# Patient Record
Sex: FEMALE | Race: WHITE | URBAN - METROPOLITAN AREA
[De-identification: names, ages, dates, MRNs, and addresses within clinical notes are randomized per-mention and may not be internally consistent; named-entity substitution may affect disease eponyms.]

---

## 2017-10-16 ENCOUNTER — TRANSFERRED RECORDS (OUTPATIENT)
Dept: HEALTH INFORMATION MANAGEMENT | Facility: CLINIC | Age: 32
End: 2017-10-16

## 2018-01-02 ENCOUNTER — TRANSFERRED RECORDS (OUTPATIENT)
Dept: HEALTH INFORMATION MANAGEMENT | Facility: HOSPITAL | Age: 33
End: 2018-01-02

## 2018-01-09 DIAGNOSIS — H81.03 MENIERE'S DISEASE, BILATERAL: Primary | ICD-10-CM

## 2018-01-10 RX ORDER — TRIAMCINOLONE ACETONIDE 0.25 MG/G
1 CREAM TOPICAL 2 TIMES DAILY
COMMUNITY
End: 2018-05-07

## 2018-01-21 ENCOUNTER — HOSPITAL ENCOUNTER (EMERGENCY)
Facility: HOSPITAL | Age: 33
Discharge: HOME | End: 2018-01-21
Attending: NURSE PRACTITIONER | Admitting: NURSE PRACTITIONER
Payer: COMMERCIAL

## 2018-01-21 VITALS
OXYGEN SATURATION: 98 % | RESPIRATION RATE: 20 BRPM | TEMPERATURE: 98.3 F | SYSTOLIC BLOOD PRESSURE: 129 MMHG | DIASTOLIC BLOOD PRESSURE: 83 MMHG | HEART RATE: 81 BPM

## 2018-01-21 DIAGNOSIS — Z87.891 PERSONAL HISTORY OF TOBACCO USE, PRESENTING HAZARDS TO HEALTH: ICD-10-CM

## 2018-01-21 DIAGNOSIS — J06.9 UPPER RESPIRATORY TRACT INFECTION, UNSPECIFIED TYPE: ICD-10-CM

## 2018-01-21 PROCEDURE — G0463 HOSPITAL OUTPT CLINIC VISIT: HCPCS

## 2018-01-21 PROCEDURE — 99202 OFFICE O/P NEW SF 15 MIN: CPT | Performed by: NURSE PRACTITIONER

## 2018-01-21 RX ORDER — CODEINE PHOSPHATE AND GUAIFENESIN 10; 100 MG/5ML; MG/5ML
1 SOLUTION ORAL EVERY 6 HOURS PRN
Qty: 120 ML | Refills: 0 | Status: SHIPPED | OUTPATIENT
Start: 2018-01-21 | End: 2018-03-28

## 2018-01-21 RX ORDER — FLUTICASONE PROPIONATE 50 MCG
1-2 SPRAY, SUSPENSION (ML) NASAL DAILY
Qty: 1 BOTTLE | Refills: 0 | Status: SHIPPED | OUTPATIENT
Start: 2018-01-21 | End: 2018-05-06

## 2018-01-21 ASSESSMENT — ENCOUNTER SYMPTOMS
ABDOMINAL PAIN: 0
FEVER: 0
RHINORRHEA: 1
SORE THROAT: 0
COUGH: 1
DIZZINESS: 1

## 2018-01-21 NOTE — ED PROVIDER NOTES
History     Chief Complaint   Patient presents with     URI     Cough     dry cough     The history is provided by the patient. No  was used.     Obdulia Rey is a 32 year old female who presents with a sinus infection for 3 weeks, states she's been on 2 - Zpaks with no improvement. Has developed a cough this past week. No fever, no change in appetite. Her Meniere's has been worse with the sinus congestion. Smokes 1 ppd.   Scheduled to see ENT tomorrow for Menieres.     Problem List:    There are no active problems to display for this patient.       Past Medical History:    Past Medical History:   Diagnosis Date     Bilateral sensorineural hearing loss      Dysfunctional uterine bleeding      GERD (gastroesophageal reflux disease)      Hearing problem      Meniere's disease      Menorrhagia with irregular cycle      Myalgia      Restless legs syndrome      Tinnitus      Tobacco use disorder      Unsteady gait        Past Surgical History:    History reviewed. No pertinent surgical history.    Family History:    Family History   Problem Relation Age of Onset     Substance Abuse Father      MENTAL ILLNESS Father      CANCER Father      Depression Father      CANCER Paternal Grandmother      DIABETES Maternal Grandmother      Hypertension Maternal Grandmother      CEREBROVASCULAR DISEASE Maternal Grandmother      HEART DISEASE Maternal Grandfather        Social History:  Marital Status:  Single [1]  Social History   Substance Use Topics     Smoking status: Current Every Day Smoker     Packs/day: 1.00     Years: 10.00     Types: Cigarettes     Start date: 2/5/2001     Smokeless tobacco: Never Used     Alcohol use 0.0 oz/week      Comment: a couple times per month        Medications:      fluticasone (FLONASE) 50 MCG/ACT spray   guaiFENesin-codeine (ROBITUSSIN AC) 100-10 MG/5ML SOLN solution   IBUPROFEN PO   HydrOXYzine HCl (ATARAX OR)   triamcinolone (KENALOG) 0.025 % cream   PARoxetine HCl  (PAXIL PO)   FEXOFENADINE HCL PO   Lansoprazole (PREVACID PO)   triamterene-hydrochlorothiazide (DYAZIDE) 37.5-25 MG per capsule   diazepam (VALIUM) 5 MG tablet   meclizine (MEDI-MECLIZINE) 25 MG tablet         Review of Systems   Constitutional: Negative for fever.   HENT: Positive for rhinorrhea. Negative for sore throat.    Respiratory: Positive for cough.    Gastrointestinal: Negative for abdominal pain.   Neurological: Positive for dizziness.       Physical Exam   BP: 129/83  Pulse: 81  Temp: 98.3  F (36.8  C)  Resp: 20  SpO2: 98 %      Physical Exam   Constitutional: She is oriented to person, place, and time. She appears well-developed and well-nourished. No distress.   HENT:   Head: Normocephalic and atraumatic.   Right Ear: External ear normal.   Left Ear: External ear normal.   Nose: Nose normal.   Mouth/Throat: Oropharynx is clear and moist. No oropharyngeal exudate.   Sinuses are clear here.    Eyes: Conjunctivae are normal. Right eye exhibits no discharge. Left eye exhibits no discharge. Scleral icterus is present.   Neck: Normal range of motion. Neck supple.   Cardiovascular: Normal rate, regular rhythm and normal heart sounds.    Pulmonary/Chest: Effort normal and breath sounds normal. No respiratory distress. She has no wheezes.   Occasional cough in office.    Musculoskeletal: Normal range of motion.   Neurological: She is alert and oriented to person, place, and time.   Skin: Skin is warm and dry. She is not diaphoretic.   Psychiatric: She has a normal mood and affect.   Nursing note and vitals reviewed.      ED Course     ED Course     Procedures    Labs Ordered and Resulted from Time of ED Arrival Up to the Time of Departure from the ED - No data to display    Assessments & Plan (with Medical Decision Making)     I have reviewed the nursing notes.  I have reviewed the findings, diagnosis, plan and need for follow up with the patient.  Afebrile, stable vitals. LS clear.   Will avoid giving another  antibiotic on top of just finishing 2 and this doesn't appear bacterial.   Advised to rest, push fluids. Call for appointment to see PCP for re-evaluation in 1-2 days.  Will give her Flonase and cough syrup with codeine for symptoms.   Advised to quit smoking.     Discharge Medication List as of 1/21/2018 11:44 AM      START taking these medications    Details   fluticasone (FLONASE) 50 MCG/ACT spray Spray 1-2 sprays into both nostrils daily, Disp-1 Bottle, R-0, E-Prescribe      guaiFENesin-codeine (ROBITUSSIN AC) 100-10 MG/5ML SOLN solution Take 5 mLs by mouth every 6 hours as needed for cough, Disp-120 mL, R-0, Local Print             Final diagnoses:   Upper respiratory tract infection, unspecified type   Personal history of tobacco use, presenting hazards to health       1/21/2018   [facility]     Anastasia Camp NP  01/21/18 [id number]

## 2018-01-21 NOTE — ED NOTES
C/o dry cough and URI for last 2-3 weeks pt states that she was seen by PCP and given two rounds of z pack with no relief

## 2018-01-21 NOTE — DISCHARGE INSTRUCTIONS
Call for a follow up to see PCP tomorrow for re-evaluation.   Rest and push fluids.   Take medications as ordered.   You may also want to try a netti-pot or saline spray in your sinuses.   Quit smoking.       Viral Upper Respiratory Illness (Adult)  You have a viral upper respiratory illness (URI), which is another term for the common cold. This illness is contagious during the first few days. It is spread through the air by coughing and sneezing. It may also be spread by direct contact (touching the sick person and then touching your own eyes, nose, or mouth). Frequent handwashing will decrease risk of spread. Most viral illnesses go away within 7 to 10 days with rest and simple home remedies. Sometimes the illness may last for several weeks. Antibiotics will not kill a virus, and they are generally not prescribed for this condition.    Home care    If symptoms are severe, rest at home for the first 2 to 3 days. When you resume activity, don't let yourself get too tired.    Avoid being exposed to cigarette smoke (yours or others ).    You may use acetaminophen or ibuprofen to control pain and fever, unless another medicine was prescribed. (Note: If you have chronic liver or kidney disease, have ever had a stomach ulcer or gastrointestinal bleeding, or are taking blood-thinning medicines, talk with your healthcare provider before using these medicines.) Aspirin should never be given to anyone under 18 years of age who is ill with a viral infection or fever. It may cause severe liver or brain damage.    Your appetite may be poor, so a light diet is fine. Avoid dehydration by drinking 6 to 8 glasses of fluids per day (water, soft drinks, juices, tea, or soup). Extra fluids will help loosen secretions in the nose and lungs.    Over-the-counter cold medicines will not shorten the length of time you re sick, but they may be helpful for the following symptoms: cough, sore throat, and nasal and sinus congestion. (Note: Do  not use decongestants if you have high blood pressure.)  Follow-up care  Follow up with your healthcare provider, or as advised.  When to seek medical advice  Call your healthcare provider right away if any of these occur:    Cough with lots of colored sputum (mucus)    Severe headache; face, neck, or ear pain    Difficulty swallowing due to throat pain    Fever of 100.4 F (38 C)  Call 911, or get immediate medical care  Call emergency services right away if any of these occur:    Chest pain, shortness of breath, wheezing, or difficulty breathing    Coughing up blood    Inability to swallow due to throat pain  Date Last Reviewed: 9/13/2015 2000-2017 [employer]. [address] [place] [address], [place] [address]. All rights reserved. This information is not intended as a substitute for professional medical care. Always follow your healthcare professional's instructions.

## 2018-01-21 NOTE — ED AVS SNAPSHOT
[facility]    [address] [place] [address]    Phone:  [phone]                                       Obdulia Rey   MRN: [id number]    Department:  [facility]   Date of Visit:  1/21/2018           Patient Information     Date Of Birth          1985        Your diagnoses for this visit were:     Upper respiratory tract infection, unspecified type     Personal history of tobacco use, presenting hazards to health        You were seen by Anastasia Camp NP.      Follow-up Information     Follow up with Kari Longo NP. Schedule an appointment as soon as possible for a visit in 1 day.    Specialty:  Nurse Practitioner    Why:  for re-evaluation    Contact information:    [facility]  [address] [facility] [address]  [phone]          Discharge Instructions       Call for a follow up to see PCP tomorrow for re-evaluation.   Rest and push fluids.   Take medications as ordered.   You may also want to try a netti-pot or saline spray in your sinuses.   Quit smoking.       Viral Upper Respiratory Illness (Adult)  You have a viral upper respiratory illness (URI), which is another term for the common cold. This illness is contagious during the first few days. It is spread through the air by coughing and sneezing. It may also be spread by direct contact (touching the sick person and then touching your own eyes, nose, or mouth). Frequent handwashing will decrease risk of spread. Most viral illnesses go away within 7 to 10 days with rest and simple home remedies. Sometimes the illness may last for several weeks. Antibiotics will not kill a virus, and they are generally not prescribed for this condition.    Home care    If symptoms are severe, rest at home for the first 2 to 3 days. When you resume activity, don't let yourself get too tired.    Avoid being exposed to cigarette smoke (yours or others ).    You may use acetaminophen or  ibuprofen to control pain and fever, unless another medicine was prescribed. (Note: If you have chronic liver or kidney disease, have ever had a stomach ulcer or gastrointestinal bleeding, or are taking blood-thinning medicines, talk with your healthcare provider before using these medicines.) Aspirin should never be given to anyone under 18 years of age who is ill with a viral infection or fever. It may cause severe liver or brain damage.    Your appetite may be poor, so a light diet is fine. Avoid dehydration by drinking 6 to 8 glasses of fluids per day (water, soft drinks, juices, tea, or soup). Extra fluids will help loosen secretions in the nose and lungs.    Over-the-counter cold medicines will not shorten the length of time you re sick, but they may be helpful for the following symptoms: cough, sore throat, and nasal and sinus congestion. (Note: Do not use decongestants if you have high blood pressure.)  Follow-up care  Follow up with your healthcare provider, or as advised.  When to seek medical advice  Call your healthcare provider right away if any of these occur:    Cough with lots of colored sputum (mucus)    Severe headache; face, neck, or ear pain    Difficulty swallowing due to throat pain    Fever of 100.4 F (38 C)  Call 911, or get immediate medical care  Call emergency services right away if any of these occur:    Chest pain, shortness of breath, wheezing, or difficulty breathing    Coughing up blood    Inability to swallow due to throat pain  Date Last Reviewed: 9/13/2015 2000-2017 [employer]. [address] [place] [address]. All rights reserved. This information is not intended as a substitute for professional medical care. Always follow your healthcare professional's instructions.            Discharge References/Attachments     SMOKING CESSATION (ENGLISH)    SMOKING WITHDRAWAL, COPING WITH (ENGLISH)      Future Appointments        Provider Department Dept Phone Center     1/26/2018 1:30 PM Abdulkadir Soni [facility] [phone] [place]    1/26/2018 2:00 PM Aurora Loja PA-C [facility] [phone] [place]         Review of your medicines      START taking        Dose / Directions Last dose taken    fluticasone 50 MCG/ACT spray   Commonly known as:  FLONASE   Dose:  1-2 spray   Quantity:  1 Bottle        Spray 1-2 sprays into both nostrils daily   Refills:  0        guaiFENesin-codeine 100-10 MG/5ML Soln solution   Commonly known as:  ROBITUSSIN AC   Dose:  1 tsp.   Quantity:  120 mL        Take 5 mLs by mouth every 6 hours as needed for cough   Refills:  0          Our records show that you are taking the medicines listed below. If these are incorrect, please call your family doctor or clinic.        Dose / Directions Last dose taken    ATARAX OR   Dose:  25 mg        Take 25 mg by mouth every 8 hours as needed   Refills:  0        diazepam 5 MG tablet   Commonly known as:  VALIUM   Dose:  5 mg   Quantity:  20 tablet        Take 1 tablet (5 mg) by mouth every 6 hours as needed for anxiety, sleep or other (dizziness)   Refills:  0        FEXOFENADINE HCL PO   Dose:  180 mg        Take 180 mg by mouth daily   Refills:  0        IBUPROFEN PO   Dose:  800 mg        Take 800 mg by mouth every 8 hours   Refills:  0        meclizine 25 MG tablet   Commonly known as:  MEDI-MECLIZINE   Dose:  50 mg   Quantity:  90 tablet        Take 2 tablets (50 mg) by mouth 3 times daily as needed for dizziness   Refills:  0        PAXIL PO   Dose:  10 mg        Take 10 mg by mouth daily   Refills:  0        PREVACID PO        Refills:  0        triamcinolone 0.025 % cream   Commonly known as:  KENALOG   Dose:  1 applicator        Apply 1 applicator topically 2 times daily   Refills:  0        triamterene-hydrochlorothiazide 37.5-25 MG per capsule   Commonly known as:  DYAZIDE   Dose:  1 capsule   Quantity:  30 capsule        Take 1 capsule by mouth daily  "  Refills:  11                Prescriptions were sent or printed at these locations (2 Prescriptions)                   [id number] - [place] - [address] [address]    Telephone:  [phone]   Fax:  [phone]   Hours:                  E-Prescribed (1 of 2)         fluticasone (FLONASE) 50 MCG/ACT spray                 Printed at Department/Unit printer (1 of 2)         guaiFENesin-codeine (ROBITUSSIN AC) 100-10 MG/5ML SOLN solution                Orders Needing Specimen Collection     None      Pending Results     No orders found from 2018 to 2018.            Pending Culture Results     No orders found from 2018 to 2018.            Thank you for choosing [facility]       Thank you for choosing [facility] for your care. Our goal is always to provide you with excellent care. Hearing back from our patients is one way we can continue to improve our services. Please take a few minutes to complete the written survey that you may receive in the mail after you visit with us. Thank you!        [facility] Information     [facility] lets you send messages to your doctor, view your test results, renew your prescriptions, schedule appointments and more. To sign up, go to [email or web address] . Click on \"Log in\" on the left side of the screen, which will take you to the Welcome page. Then click on \"Sign up Now\" on the right side of the page.     You will be asked to enter the access code listed below, as well as some personal information. Please follow the directions to create your username and password.     Your access code is: [email or web address]  Expires: 2018 11:44 AM     Your access code will  in 90 days. If you need help or a new code, please call your [facility] clinic or [phone].        Care EveryWhere ID     This is your Care EveryWhere ID. This could be used by other organizations to access your [facility] medical records  [id number]        Equal Access " to Services     AMANDA PUTNAM : Kelly Ruggiero, abimael daniels, indigo simpson. [facility] [phone].    ATENCIÓN: Si habla español, tiene a lopez disposición servicios gratuitos de asistencia lingüística. Llame al [phone].    We comply with applicable federal civil rights laws and [place] laws. We do not discriminate on the basis of race, color, national origin, age, disability, sex, sexual orientation, or gender identity.            After Visit Summary       This is your record. Keep this with you and show to your community pharmacist(s) and doctor(s) at your next visit.

## 2018-01-21 NOTE — ED AVS SNAPSHOT
[facility]    [address] [address]    Phone:  [phone]                                       Obdulia Rey   MRN: [id number]    Department:  [facility]   Date of Visit:  1/21/2018           After Visit Summary Signature Page     I have received my discharge instructions, and my questions have been answered. I have discussed any challenges I see with this plan with the nurse or doctor.    ..........................................................................................................................................  Patient/Patient Representative Signature      ..........................................................................................................................................  Patient Representative Print Name and Relationship to Patient    ..................................................               ................................................  Date                                            Time    ..........................................................................................................................................  Reviewed by Signature/Title    ...................................................              ..............................................  Date                                                            Time

## 2018-01-22 ENCOUNTER — TRANSFERRED RECORDS (OUTPATIENT)
Dept: HEALTH INFORMATION MANAGEMENT | Facility: HOSPITAL | Age: 33
End: 2018-01-22

## 2018-01-22 LAB
ALT SERPL-CCNC: 21 U/L (ref 18–65)
AST SERPL-CCNC: 9 U/L (ref 10–30)
CHOLEST SERPL-MCNC: 194 MG/DL
CREAT SERPL-MCNC: 0.85 MG/DL (ref 0.7–1.2)
GLUCOSE SERPL-MCNC: 95 MG/DL (ref 60–99)
HDLC SERPL-MCNC: 27 MG/DL
LDLC SERPL CALC-MCNC: 113 MG/DL
POTASSIUM SERPL-SCNC: 4.2 MEQ/L (ref 3.5–5.1)
TRIGL SERPL-MCNC: 269 MG/DL
TSH SERPL-ACNC: 1.77 MIU/ML (ref 0.35–4.8)

## 2018-02-07 ENCOUNTER — OFFICE VISIT (OUTPATIENT)
Dept: SLEEP MEDICINE | Facility: HOSPITAL | Age: 33
End: 2018-02-07
Attending: INTERNAL MEDICINE
Payer: COMMERCIAL

## 2018-02-07 ENCOUNTER — OFFICE VISIT (OUTPATIENT)
Dept: AUDIOLOGY | Facility: OTHER | Age: 33
End: 2018-02-07
Attending: AUDIOLOGIST
Payer: COMMERCIAL

## 2018-02-07 VITALS
SYSTOLIC BLOOD PRESSURE: 110 MMHG | DIASTOLIC BLOOD PRESSURE: 78 MMHG | HEIGHT: 66 IN | RESPIRATION RATE: 14 BRPM | WEIGHT: 290 LBS | HEART RATE: 98 BPM | BODY MASS INDEX: 46.61 KG/M2 | OXYGEN SATURATION: 98 %

## 2018-02-07 DIAGNOSIS — H90.8 MIXED HEARING LOSS, UNILATERAL: ICD-10-CM

## 2018-02-07 DIAGNOSIS — H69.93 DYSFUNCTION OF BOTH EUSTACHIAN TUBES: ICD-10-CM

## 2018-02-07 DIAGNOSIS — G47.33 OBSTRUCTIVE SLEEP APNEA SYNDROME: Primary | ICD-10-CM

## 2018-02-07 PROCEDURE — 99241 ZZC OFFICE CONSULTATION,LEVEL I: CPT | Performed by: INTERNAL MEDICINE

## 2018-02-07 PROCEDURE — 92567 TYMPANOMETRY: CPT | Performed by: AUDIOLOGIST

## 2018-02-07 PROCEDURE — 92557 COMPREHENSIVE HEARING TEST: CPT | Performed by: AUDIOLOGIST

## 2018-02-07 PROCEDURE — G0463 HOSPITAL OUTPT CLINIC VISIT: HCPCS

## 2018-02-07 NOTE — PROGRESS NOTES
Audiology Evaluation Completed. Please refer SCANNED AUDIOGRAM and/or TYMPANOGRAM for BACKGROUND, RESULTS, RECOMMENDATIONS.  Estephania Nguyen M.S., [facility]  Audiologist #[id number]

## 2018-02-07 NOTE — PROGRESS NOTES
"31 y/o referred by Bar Longo with sleep apnea and insomnia. Pt has been a long term snorer and has been noted to have occasional apneas. Most of her family have LATASHA and are on cpap, she is quite obese at 5 6 290. Her sleep hours are 1 A to 11A. She has consistent trouble falling asleep and staying asleep. CD gave her Trazodone and she is regularly taking 50 mg hs. It does help her with the insomnia but gives her some hangover effect.     PMH  Menieres disease, chronic, on valium 10 bid for this, hx of GERD    PE   /78  Pulse 98  Resp 14  Ht 5' 6\" (1.676 m)  Wt 290 lb (131.5 kg)  SpO2 98%  BMI 46.81 kg/m2              heent very full ant neck, narrow pharynx,       Current Outpatient Prescriptions:      fluticasone (FLONASE) 50 MCG/ACT spray, Spray 1-2 sprays into both nostrils daily, Disp: 1 Bottle, Rfl: 0     guaiFENesin-codeine (ROBITUSSIN AC) 100-10 MG/5ML SOLN solution, Take 5 mLs by mouth every 6 hours as needed for cough, Disp: 120 mL, Rfl: 0     IBUPROFEN PO, Take 800 mg by mouth every 8 hours, Disp: , Rfl:      HydrOXYzine HCl (ATARAX OR), Take 25 mg by mouth every 8 hours as needed, Disp: , Rfl:      triamcinolone (KENALOG) 0.025 % cream, Apply 1 applicator topically 2 times daily, Disp: , Rfl:      PARoxetine HCl (PAXIL PO), Take 10 mg by mouth daily, Disp: , Rfl:      FEXOFENADINE HCL PO, Take 180 mg by mouth daily, Disp: , Rfl:      Lansoprazole (PREVACID PO), , Disp: , Rfl:      triamterene-hydrochlorothiazide (DYAZIDE) 37.5-25 MG per capsule, Take 1 capsule by mouth daily, Disp: 30 capsule, Rfl: 11     diazepam (VALIUM) 5 MG tablet, Take 1 tablet (5 mg) by mouth every 6 hours as needed for anxiety, sleep or other (dizziness), Disp: 20 tablet, Rfl: 0     meclizine (MEDI-MECLIZINE) 25 MG tablet, Take 2 tablets (50 mg) by mouth 3 times daily as needed for dizziness, Disp: 90 tablet, Rfl: 0     A/ Prob LATASHA and perhaps secondary insomnia, sleep study ordered.  "

## 2018-02-07 NOTE — MR AVS SNAPSHOT
After Visit Summary   2/7/2018    Obdulia Rey    MRN: [id number]           Patient Information     Date Of Birth          1985        Visit Information        Provider Department      2/7/2018 3:15 PM Estephania Nguyen AuD [facility]        Today's Diagnoses     Dysfunction of both eustachian tubes        Mixed hearing loss, unilateral           Follow-ups after your visit        Your next 10 appointments already scheduled     Feb 08, 2018  1:45 PM CST   (Arrive by 1:30 PM)   New Visit with Aurora Loja PA-C   [facility]    [address] [facility] [address] [phone]            Feb 26, 2018  8:30 PM CST   PSG Split with [facility] RM2   [facility] ([facility] )    [address] [place] [address]   [phone]              Future tests that were ordered for you today     Open Future Orders        Priority Expected Expires Ordered    Comprehensive Sleep Study Routine  8/6/2018 2/7/2018            Who to contact     If you have questions or need follow up information about today's clinic visit or your schedule please contact [facility] directly at [phone].  Normal or non-critical lab and imaging results will be communicated to you by MyChart, letter or phone within 4 business days after the clinic has received the results. If you do not hear from us within 7 days, please contact the clinic through [employer] or phone. If you have a critical or abnormal lab result, we will notify you by phone as soon as possible.  Submit refill requests through [employer] or call your pharmacy and they will forward the refill request to us. Please allow 3 business days for your refill to be completed.          Additional Information About Your Visit        [employer] Information     [employer] lets you send messages to your doctor, view your test results, renew your prescriptions, schedule appointments and  "more. To sign up, go to [email or web address] . Click on \"Log in\" on the left side of the screen, which will take you to the Welcome page. Then click on \"Sign up Now\" on the right side of the page.     You will be asked to enter the access code listed below, as well as some personal information. Please follow the directions to create your username and password.     Your access code is: [email or web address]  Expires: 2018 11:44 AM     Your access code will  in 90 days. If you need help or a new code, please call your [place] clinic or [phone].        Care EveryWhere ID     This is your Care EveryWhere ID. This could be used by other organizations to access your [place] medical records  [id number]         Blood Pressure from Last 3 Encounters:   18 110/78   18 129/83   16 113/67    Weight from Last 3 Encounters:   18 290 lb (131.5 kg)   16 (!) 319 lb (144.7 kg)              We Performed the Following     AUDIOGRAM/TYMPANOGRAM - INTERFACE        Primary Care Provider Office Phone # Fax #    Kari Longo, TYRONE [phone]       [facility] MEDICINE [address]  [facility] [address]        Equal Access to Services     AMANDA PUTNAM AH: Hadii aad ku hadasho Sosushil, waaxda luqadaha, qaybta kaalmada kristan, indigo delgado . So [facility] [phone].    ATENCIÓN: Si habla español, tiene a lopez disposición servicios gratuitos de asistencia lingüística. Addis al [phone].    We comply with applicable federal civil rights laws and [place] laws. We do not discriminate on the basis of race, color, national origin, age, disability, sex, sexual orientation, or gender identity.            Thank you!     Thank you for choosing [facility]  for your care. Our goal is always to provide you with excellent care. Hearing back from our patients is one way we can continue to improve our services. Please take a few minutes to complete " the written survey that you may receive in the mail after your visit with us. Thank you!             Your Updated Medication List - Protect others around you: Learn how to safely use, store and throw away your medicines at www.disposemymeds.org.          This list is accurate as of 2/7/18  3:26 PM.  Always use your most recent med list.                   Brand Name Dispense Instructions for use Diagnosis    ATARAX OR      Take 25 mg by mouth every 8 hours as needed        diazepam 5 MG tablet    VALIUM    20 tablet    Take 1 tablet (5 mg) by mouth every 6 hours as needed for anxiety, sleep or other (dizziness)        FEXOFENADINE HCL PO      Take 180 mg by mouth daily        fluticasone 50 MCG/ACT spray    FLONASE    1 Bottle    Spray 1-2 sprays into both nostrils daily        guaiFENesin-codeine 100-10 MG/5ML Soln solution    ROBITUSSIN AC    120 mL    Take 5 mLs by mouth every 6 hours as needed for cough        IBUPROFEN PO      Take 800 mg by mouth every 8 hours        meclizine 25 MG tablet    MEDI-MECLIZINE    90 tablet    Take 2 tablets (50 mg) by mouth 3 times daily as needed for dizziness        PAXIL PO      Take 10 mg by mouth daily        PREVACID PO           triamcinolone 0.025 % cream    KENALOG     Apply 1 applicator topically 2 times daily        triamterene-hydrochlorothiazide 37.5-25 MG per capsule    DYAZIDE    30 capsule    Take 1 capsule by mouth daily    Meniere's disease, left

## 2018-02-07 NOTE — NURSING NOTE
Patient ID checked with name and date of birth. Reviewed allergies and home medications. Took Vitals and brief medical history.   Scheduled patient for an overnight sleep test and reviewed the instructions with her she had fair understanding.

## 2018-02-07 NOTE — MR AVS SNAPSHOT
After Visit Summary   2/7/2018    Obdulia Rey    MRN: [id number]           Patient Information     Date Of Birth          1985        Visit Information        Provider Department      2/7/2018 2:00 PM Jeremy Carmen MD [facility]        Today's Diagnoses     Obstructive sleep apnea syndrome    -  1       Follow-ups after your visit        Your next 10 appointments already scheduled     Feb 07, 2018  3:15 PM CST   (Arrive by 3:00 PM)   Hearing Eval with Abdulkadir Tobin   [facility] [place] ([facility] [place] )    [address]  [place] [address]   [phone]            Feb 08, 2018  1:45 PM CST   (Arrive by 1:30 PM)   New Visit with Aurora Loja PA-C   [facility] [place] ([facility] [place] )    [address]  [place] [address]   [phone]              Future tests that were ordered for you today     Open Future Orders        Priority Expected Expires Ordered    Comprehensive Sleep Study Routine  8/6/2018 2/7/2018            Who to contact     If you have questions or need follow up information about today's clinic visit or your schedule please contact [facility] directly at [phone].  Normal or non-critical lab and imaging results will be communicated to you by MyChart, letter or phone within 4 business days after the clinic has received the results. If you do not hear from us within 7 days, please contact the clinic through [employer] or phone. If you have a critical or abnormal lab result, we will notify you by phone as soon as possible.  Submit refill requests through [employer] or call your pharmacy and they will forward the refill request to us. Please allow 3 business days for your refill to be completed.          Additional Information About Your Visit        [employer] Information     [employer] lets you send messages to your doctor, view your test results, renew your prescriptions, schedule appointments and more. To sign up, go  "to [email or web address] . Click on \"Log in\" on the left side of the screen, which will take you to the Welcome page. Then click on \"Sign up Now\" on the right side of the page.     You will be asked to enter the access code listed below, as well as some personal information. Please follow the directions to create your username and password.     Your access code is: [email or web address]  Expires: 2018 11:44 AM     Your access code will  in 90 days. If you need help or a new code, please call your [place] clinic or [phone].        Care EveryWhere ID     This is your Care EveryWhere ID. This could be used by other organizations to access your [place] medical records  [id number]        Your Vitals Were     Pulse Respirations Height Pulse Oximetry BMI (Body Mass Index)       98 14 5' 6\" (1.676 m) 98% 46.81 kg/m2        Blood Pressure from Last 3 Encounters:   18 110/78   18 129/83   16 113/67    Weight from Last 3 Encounters:   18 290 lb (131.5 kg)   16 (!) 319 lb (144.7 kg)               Primary Care Provider Office Phone # Fax #    Kari TYRONE Longo [phone]       [facility] MEDICINE [address] [facility] [address]        Equal Access to Services     ALYCIA PUTNAM AH: Hadii abraham whittakero Sosushil, waaxda luqadaha, qaybta kaalmada kristan, indigo donahue. So [facility] [phone].    ATENCIÓN: Si habla español, tiene a lopez disposición servicios gratuitos de asistencia lingüística. Addis mcbride [phone].    We comply with applicable federal civil rights laws and [place] laws. We do not discriminate on the basis of race, color, national origin, age, disability, sex, sexual orientation, or gender identity.            Thank you!     Thank you for choosing [facility]  for your care. Our goal is always to provide you with excellent care. Hearing back from our patients is one way we can continue to improve our services. Please " take a few minutes to complete the written survey that you may receive in the mail after your visit with us. Thank you!             Your Updated Medication List - Protect others around you: Learn how to safely use, store and throw away your medicines at www.disposemymeds.org.          This list is accurate as of 2/7/18  2:27 PM.  Always use your most recent med list.                   Brand Name Dispense Instructions for use Diagnosis    ATARAX OR      Take 25 mg by mouth every 8 hours as needed        diazepam 5 MG tablet    VALIUM    20 tablet    Take 1 tablet (5 mg) by mouth every 6 hours as needed for anxiety, sleep or other (dizziness)        FEXOFENADINE HCL PO      Take 180 mg by mouth daily        fluticasone 50 MCG/ACT spray    FLONASE    1 Bottle    Spray 1-2 sprays into both nostrils daily        guaiFENesin-codeine 100-10 MG/5ML Soln solution    ROBITUSSIN AC    120 mL    Take 5 mLs by mouth every 6 hours as needed for cough        IBUPROFEN PO      Take 800 mg by mouth every 8 hours        meclizine 25 MG tablet    MEDI-MECLIZINE    90 tablet    Take 2 tablets (50 mg) by mouth 3 times daily as needed for dizziness        PAXIL PO      Take 10 mg by mouth daily        PREVACID PO           triamcinolone 0.025 % cream    KENALOG     Apply 1 applicator topically 2 times daily        triamterene-hydrochlorothiazide 37.5-25 MG per capsule    DYAZIDE    30 capsule    Take 1 capsule by mouth daily    Meniere's disease, left

## 2018-03-20 ENCOUNTER — THERAPY VISIT (OUTPATIENT)
Dept: SLEEP MEDICINE | Facility: HOSPITAL | Age: 33
End: 2018-03-20
Attending: INTERNAL MEDICINE
Payer: COMMERCIAL

## 2018-03-20 DIAGNOSIS — G47.33 OBSTRUCTIVE SLEEP APNEA SYNDROME: ICD-10-CM

## 2018-03-20 PROCEDURE — 95810 POLYSOM 6/> YRS 4/> PARAM: CPT

## 2018-03-20 PROCEDURE — 95810 POLYSOM 6/> YRS 4/> PARAM: CPT | Mod: 26 | Performed by: INTERNAL MEDICINE

## 2018-03-20 NOTE — MR AVS SNAPSHOT
"              After Visit Summary   3/20/2018    Obdulia Rey    MRN: [id number]           Patient Information     Date Of Birth          1985        Visit Information        Provider Department      3/20/2018 8:30 PM [facility]        Today's Diagnoses     Obstructive sleep apnea syndrome           Follow-ups after your visit        Who to contact     If you have questions or need follow up information about today's clinic visit or your schedule please contact [facility] directly at [phone].  Normal or non-critical lab and imaging results will be communicated to you by MyChart, letter or phone within 4 business days after the clinic has received the results. If you do not hear from us within 7 days, please contact the clinic through [employer] or phone. If you have a critical or abnormal lab result, we will notify you by phone as soon as possible.  Submit refill requests through [employer] or call your pharmacy and they will forward the refill request to us. Please allow 3 business days for your refill to be completed.          Additional Information About Your Visit        MyChart Information     [employer] lets you send messages to your doctor, view your test results, renew your prescriptions, schedule appointments and more. To sign up, go to [email or web address] . Click on \"Log in\" on the left side of the screen, which will take you to the Welcome page. Then click on \"Sign up Now\" on the right side of the page.     You will be asked to enter the access code listed below, as well as some personal information. Please follow the directions to create your username and password.     Your access code is: [email or web address]  Expires: 2018 12:44 PM     Your access code will  in 90 days. If you need help or a new code, please call your [place] clinic or [phone].        Care EveryWhere ID     This is your Care EveryWhere ID. This could be used by other organizations to access " your [place] medical records  [id number]         Blood Pressure from Last 3 Encounters:   02/07/18 110/78   01/21/18 129/83   04/19/16 113/67    Weight from Last 3 Encounters:   02/07/18 290 lb (131.5 kg)   09/28/16 (!) 319 lb (144.7 kg)              We Performed the Following     Comprehensive Sleep Study        Primary Care Provider Office Phone # Fax #    Kari Saucedolitoericaskjaylen, TYRONE [phone]       [facility] [address]  [facility] [address]        Equal Access to Services     [facility]: Hadii abraham petersen hadasho Soomaali, waaxda luqadaha, qaybta kaalmada kristan, indigo delgado . So [facility] [phone].    ATENCIÓN: Si habla español, tiene a lopez disposición servicios gratuitos de asistencia lingüística. [facility] [phone].    We comply with applicable federal civil rights laws and [place] laws. We do not discriminate on the basis of race, color, national origin, age, disability, sex, sexual orientation, or gender identity.            Thank you!     Thank you for choosing [facility]  for your care. Our goal is always to provide you with excellent care. Hearing back from our patients is one way we can continue to improve our services. Please take a few minutes to complete the written survey that you may receive in the mail after your visit with us. Thank you!             Your Updated Medication List - Protect others around you: Learn how to safely use, store and throw away your medicines at www.disposemymeds.org.          This list is accurate as of 3/20/18 11:59 PM.  Always use your most recent med list.                   Brand Name Dispense Instructions for use Diagnosis    ATARAX OR      Take 25 mg by mouth every 8 hours as needed        diazepam 5 MG tablet    VALIUM    20 tablet    Take 1 tablet (5 mg) by mouth every 6 hours as needed for anxiety, sleep or other (dizziness)        FEXOFENADINE HCL PO      Take 180 mg by mouth daily         fluticasone 50 MCG/ACT spray    FLONASE    1 Bottle    Spray 1-2 sprays into both nostrils daily        guaiFENesin-codeine 100-10 MG/5ML Soln solution    ROBITUSSIN AC    120 mL    Take 5 mLs by mouth every 6 hours as needed for cough        IBUPROFEN PO      Take 800 mg by mouth every 8 hours        meclizine 25 MG tablet    MEDI-MECLIZINE    90 tablet    Take 2 tablets (50 mg) by mouth 3 times daily as needed for dizziness        PAXIL PO      Take 10 mg by mouth daily        PREVACID PO           triamcinolone 0.025 % cream    KENALOG     Apply 1 applicator topically 2 times daily

## 2018-03-20 NOTE — LETTER
Obdulia PRATT Terril  [address] [facility] [address]    March 21, 2018       Dear Obdulia                  I recently read your sleep study, and was somewhat surprised to find you really didn't have significant sleep apnea. I guess we should just wait a few months and see if your sleepiness worsens. If so we could consider repeating a sleep study.                                                                                        Sincerely,        Jeremy Carmen MD, D,[facility]           [address] [place] [id number] [phone]

## 2018-03-21 NOTE — PROGRESS NOTES
33 y/o referred by Bar Longo for excessive daytime somnolence.   Overnight 18 channel polysomnography was done 3/20/18, I reviewed the raw data in detail.Please see scanned sleep study for full results.Sleep efficiency was normal with a normal sleep latency and a prolonged REM latency. Sleep architecture shows all stages seen in usual amounts for age. Baseline oxyhemoglobin saturation was 98%. The ECG was monitored and no arrhythmias were seen. There were no significant periodic leg movements noted.              Technician noted moderate snoring. We measured 1 apneas and 23 hypopneas early in the study. These events were associated with EEG arousals and desats down to 87%. The apnea hypopnea index was 3.3 events per hour. Some snoring arousals were seen.                 Impression: Fairly normal NPSG, some snoring arousals, hx of insomnia.

## 2018-03-21 NOTE — PROGRESS NOTES
The Pt was identified by name and  as well as wristband.  She is a 33 yo female with c/o snoring and EDS.  She reports a strong family hx of LATASHA.  Sleep testing and possible findings were discussed during hook up.  LATASHA and CPAP therapy were explained as she was fitted with a Respironics Lauren View small mask.  Sleep onset was delayed.  She had frequent arousals.  Her snoring was often heroic in nonREM sleep.  REM onset was not seen until late in the study.  Her snoring in REM was none to light. The SpO2 baseline in sleep was 95%.  The lowest SpO2 was 87%.  No PLMs or arrhythmias were noted.  CPAP was not attempted as she did not appear to meet criteria. The preliminary results were discussed.  After reviewing the vendor choices, she prefers [email or web address] for her DME.

## 2018-03-21 NOTE — PROGRESS NOTES
Patient is a 31 y/o female in with c/o snoring and EDS.  Sleep onset was delayed and REM was not seen until late into study.  Light to very loud snoring noted with a few arousals and subtle respiratory events.  Patient was not attempted on CPAP as technologist did not feel that she qualified.  Patient tolerated study well.

## 2018-03-28 ENCOUNTER — OFFICE VISIT (OUTPATIENT)
Dept: OTOLARYNGOLOGY | Facility: OTHER | Age: 33
End: 2018-03-28
Attending: PHYSICIAN ASSISTANT
Payer: COMMERCIAL

## 2018-03-28 ENCOUNTER — TELEPHONE (OUTPATIENT)
Dept: ALLERGY | Facility: OTHER | Age: 33
End: 2018-03-28

## 2018-03-28 VITALS
TEMPERATURE: 97.6 F | SYSTOLIC BLOOD PRESSURE: 118 MMHG | HEIGHT: 66 IN | HEART RATE: 82 BPM | DIASTOLIC BLOOD PRESSURE: 76 MMHG | OXYGEN SATURATION: 98 % | WEIGHT: 290 LBS | BODY MASS INDEX: 46.61 KG/M2

## 2018-03-28 DIAGNOSIS — H90.3 ASNHL (ASYMMETRICAL SENSORINEURAL HEARING LOSS): ICD-10-CM

## 2018-03-28 DIAGNOSIS — H81.02 MENIERE'S DISEASE, LEFT: Primary | ICD-10-CM

## 2018-03-28 DIAGNOSIS — F32.A DEPRESSION, UNSPECIFIED DEPRESSION TYPE: ICD-10-CM

## 2018-03-28 DIAGNOSIS — J30.2 SEASONAL ALLERGIC RHINITIS, UNSPECIFIED CHRONICITY, UNSPECIFIED TRIGGER: ICD-10-CM

## 2018-03-28 DIAGNOSIS — R42 VERTIGO: ICD-10-CM

## 2018-03-28 DIAGNOSIS — F41.9 ANXIETY: ICD-10-CM

## 2018-03-28 PROCEDURE — G0463 HOSPITAL OUTPT CLINIC VISIT: HCPCS

## 2018-03-28 PROCEDURE — 99214 OFFICE O/P EST MOD 30 MIN: CPT | Mod: 25 | Performed by: PHYSICIAN ASSISTANT

## 2018-03-28 PROCEDURE — 92504 EAR MICROSCOPY EXAMINATION: CPT | Performed by: PHYSICIAN ASSISTANT

## 2018-03-28 RX ORDER — TRIAMTERENE AND HYDROCHLOROTHIAZIDE 37.5; 25 MG/1; MG/1
1 CAPSULE ORAL EVERY MORNING
COMMUNITY

## 2018-03-28 RX ORDER — DIAZEPAM 5 MG
5 TABLET ORAL EVERY 8 HOURS PRN
Qty: 40 TABLET | Refills: 0 | Status: SHIPPED | OUTPATIENT
Start: 2018-03-28 | End: 2018-04-09

## 2018-03-28 RX ORDER — CETIRIZINE HYDROCHLORIDE 10 MG/1
10 TABLET ORAL EVERY EVENING
Qty: 30 TABLET | Refills: 11 | Status: SHIPPED | OUTPATIENT
Start: 2018-03-28 | End: 2019-08-22

## 2018-03-28 ASSESSMENT — PAIN SCALES - GENERAL: PAINLEVEL: MILD PAIN (3)

## 2018-03-28 NOTE — NURSING NOTE
"Chief Complaint   Patient presents with     Consult     Meniere's; Self Referral---The patient has had an increase in issues over the last 2 weeks. She was given Valium and Meclizine in the past        Initial /76 (Cuff Size: Adult Large)  Pulse 82  Temp 97.6  F (36.4  C) (Tympanic)  Ht 5' 6\" (1.676 m)  Wt 290 lb (131.5 kg)  SpO2 98%  BMI 46.81 kg/m2 Estimated body mass index is 46.81 kg/(m^2) as calculated from the following:    Height as of this encounter: 5' 6\" (1.676 m).    Weight as of this encounter: 290 lb (131.5 kg).  Medication Reconciliation: cherelle Hardwick      "

## 2018-03-28 NOTE — MR AVS SNAPSHOT
After Visit Summary   3/28/2018    Obdulia Rey    MRN: [id number]           Patient Information     Date Of Birth          1985        Visit Information        Provider Department      3/28/2018 8:15 AM Aurora Loja PA-C [facility]        Today's Diagnoses     Meniere's disease, left    -  1    ASNHL (asymmetrical sensorineural hearing loss)        Vertigo        Seasonal allergic rhinitis, unspecified chronicity, unspecified trigger          Care Instructions    Release records. Needs ENT visits/ MRI  Decrease tobacco. Tobacco cessation strongly encouraged!  Low sodium diet. Dietary Consult.   Start Zyrtec 10 mg one tablet at night. Hold for 5 days before allergy testing.   Complete allergy testing.   Schedule consult with Dr Triplett/ or TYRONE Andersen for anxiety/ depression.     Consider trial of Michael Med rinse.       I spent the remainder of today's visit educating the patient about the current theory on the cause of Meniere's Disease and the reasoning behind its treatment.  We discussed the CATS (Caffeine, Alcohol, Tobacco, Salt/Stress) Avoidance Diet, as well as safety measures to be done at home to avoid injury.  Finally, the variability, potential permanent hearing loss, and natural course of Meniere's disease, including 30% incidence of eventual bilateral involvement, was also discussed.    The use of Dyazide as well as Potassium replacement was also discussed as the next step in treatment.    Annual audiogram     It takes 20 years of quitting tobacco to be at same risk of developing head and neck cancer as a never smoker.  Every year of cessation offers health benefits. This was discussed with the patient today and they voiced understanding.  Tobacco cessation was strongly encouraged.    Thank you for allowing JIMENA Pérez and our ENT team to participate in your care.  If your medications are too expensive, please give the nurse a call.  We can possibly change this  "medication.  If you have a scheduling or an appointment question please contact [facility] Unit Coordinator at their direct line [phone].   ALL nursing questions or concerns can be directed to your ENT nurse at: [phone] [facility]              Follow-ups after your visit        Who to contact     If you have questions or need follow up information about today's clinic visit or your schedule please contact [facility] [place] directly at [phone].  Normal or non-critical lab and imaging results will be communicated to you by Mediamorphhart, letter or phone within 4 business days after the clinic has received the results. If you do not hear from us within 7 days, please contact the clinic through [employer] or phone. If you have a critical or abnormal lab result, we will notify you by phone as soon as possible.  Submit refill requests through [employer] or call your pharmacy and they will forward the refill request to us. Please allow 3 business days for your refill to be completed.          Additional Information About Your Visit        [employer] Information     [employer] lets you send messages to your doctor, view your test results, renew your prescriptions, schedule appointments and more. To sign up, go to [email or web address] . Click on \"Log in\" on the left side of the screen, which will take you to the Welcome page. Then click on \"Sign up Now\" on the right side of the page.     You will be asked to enter the access code listed below, as well as some personal information. Please follow the directions to create your username and password.     Your access code is: [email or web address]  Expires: 2018 12:44 PM     Your access code will  in 90 days. If you need help or a new code, please call your [facility] or [phone].        Care EveryWhere ID     This is your Care EveryWhere ID. This could be used by other organizations to access your [facility] medical records  [id number]        Your Vitals Were  " "   Pulse Temperature Height Pulse Oximetry BMI (Body Mass Index)       82 97.6  F (36.4  C) (Tympanic) 5' 6\" (1.676 m) 98% 46.81 kg/m2        Blood Pressure from Last 3 Encounters:   03/28/18 118/76   02/07/18 110/78   01/21/18 129/83    Weight from Last 3 Encounters:   03/28/18 290 lb (131.5 kg)   02/07/18 290 lb (131.5 kg)   09/28/16 (!) 319 lb (144.7 kg)              Today, you had the following     No orders found for display         Today's Medication Changes          These changes are accurate as of 3/28/18  8:42 AM.  If you have any questions, ask your nurse or doctor.               Start taking these medicines.        Dose/Directions    cetirizine 10 MG tablet   Commonly known as:  zyrTEC   Used for:  Meniere's disease, left, Seasonal allergic rhinitis, unspecified chronicity, unspecified trigger   Started by:  Aurora Loja PA-C        Dose:  10 mg   Take 1 tablet (10 mg) by mouth every evening   Quantity:  30 tablet   Refills:  11         These medicines have changed or have updated prescriptions.        Dose/Directions    diazepam 5 MG tablet   Commonly known as:  VALIUM   This may have changed:    - when to take this  - reasons to take this   Used for:  Meniere's disease, left   Changed by:  Aurora Loja PA-C        Dose:  5 mg   Take 1 tablet (5 mg) by mouth every 8 hours as needed for other (Vertigo)   Quantity:  40 tablet   Refills:  0            Where to get your medicines      These medications were sent to [id number] - [place] - [address] [address]  [address], [place] [address]     Phone:  [phone]     cetirizine 10 MG tablet         Some of these will need a paper prescription and others can be bought over the counter.  Ask your nurse if you have questions.     Bring a paper prescription for each of these medications     diazepam 5 MG tablet                Primary Care Provider Office Phone # Fax #    Kari Longo [phone]       [facility] [facility] " [address] [facility] [address]        Equal Access to Services     RUIALYCIA JERSEY : Hadii aad ku hadcolin Sosushil, wakellyda luqadaha, qaybta kamaritzada valeriepranavberhane, indigo minerjaswindernorman donahue. So [facility] [phone].    ATENCIÓN: Si habla español, tiene a lopez disposición servicios gratuitos de asistencia lingüística. Llame al [phone].    We comply with applicable federal civil rights laws and [place] laws. We do not discriminate on the basis of race, color, national origin, age, disability, sex, sexual orientation, or gender identity.            Thank you!     Thank you for choosing [facility]  for your care. Our goal is always to provide you with excellent care. Hearing back from our patients is one way we can continue to improve our services. Please take a few minutes to complete the written survey that you may receive in the mail after your visit with us. Thank you!             Your Updated Medication List - Protect others around you: Learn how to safely use, store and throw away your medicines at www.disposemymeds.org.          This list is accurate as of 3/28/18  8:42 AM.  Always use your most recent med list.                   Brand Name Dispense Instructions for use Diagnosis    ATARAX OR      Take 25 mg by mouth every 8 hours as needed        cetirizine 10 MG tablet    zyrTEC    30 tablet    Take 1 tablet (10 mg) by mouth every evening    Meniere's disease, left, Seasonal allergic rhinitis, unspecified chronicity, unspecified trigger       diazepam 5 MG tablet    VALIUM    40 tablet    Take 1 tablet (5 mg) by mouth every 8 hours as needed for other (Vertigo)    Meniere's disease, left       FEXOFENADINE HCL PO      Take 180 mg by mouth daily        fluticasone 50 MCG/ACT spray    FLONASE    1 Bottle    Spray 1-2 sprays into both nostrils daily        IBUPROFEN PO      Take 800 mg by mouth every 8 hours        meclizine 25 MG tablet    MEDI-MECLIZINE    90 tablet    Take 2 tablets  (50 mg) by mouth 3 times daily as needed for dizziness        PAXIL PO      Take 10 mg by mouth daily        triamcinolone 0.025 % cream    KENALOG     Apply 1 applicator topically 2 times daily        triamterene-hydrochlorothiazide 37.5-25 MG per capsule    DYAZIDE     Take 1 capsule by mouth every morning

## 2018-03-28 NOTE — TELEPHONE ENCOUNTER
Attempted to reach patient regarding MQT allergy testing instructions and scheduling, no answer.  Left message for patient to return call.    Kari Ku RN

## 2018-03-28 NOTE — PATIENT INSTRUCTIONS
Release records. Needs ENT visits/ MRI  Decrease tobacco. Tobacco cessation strongly encouraged!  Low sodium diet. Dietary Consult.   Start Zyrtec 10 mg one tablet at night. Hold for 5 days before allergy testing.   Complete allergy testing.   Schedule consult with Dr Triplett/ or TYRONE Andersen for anxiety/ depression.     Consider trial of Michael Med rinse.       I spent the remainder of today's visit educating the patient about the current theory on the cause of Meniere's Disease and the reasoning behind its treatment.  We discussed the CATS (Caffeine, Alcohol, Tobacco, Salt/Stress) Avoidance Diet, as well as safety measures to be done at home to avoid injury.  Finally, the variability, potential permanent hearing loss, and natural course of Meniere's disease, including 30% incidence of eventual bilateral involvement, was also discussed.    The use of Dyazide as well as Potassium replacement was also discussed as the next step in treatment.    Annual audiogram     It takes 20 years of quitting tobacco to be at same risk of developing head and neck cancer as a never smoker.  Every year of cessation offers health benefits. This was discussed with the patient today and they voiced understanding.  Tobacco cessation was strongly encouraged.    Thank you for allowing JIMENA Pérez and our ENT team to participate in your care.  If your medications are too expensive, please give the nurse a call.  We can possibly change this medication.  If you have a scheduling or an appointment question please contact [place] our Health Unit Coordinator at their direct line [phone].   ALL nursing questions or concerns can be directed to your ENT nurse at: [phone] [place]

## 2018-03-28 NOTE — LETTER
3/28/2018         RE: Obdulia Rey  [address]  [place] [address]        Dear Colleague,    Thank you for referring your patient, Obdulia Rey, to the [facility]. Please see a copy of my visit note below.    Otolaryngology Consultation    Patient: Obdulia Rey  : 1985    Chief Complaint   Patient presents with     Consult     Meniere's; Self Referral---The patient has had an increase in issues over the last 2 weeks. She was given Valium and Meclizine in the past          HPI:  Obdulia Rey is a 32 year old female seen today for Meniere's Disease of left ear. This is her first visit to ENT in [place] and was previously seen at the [facility]. ENT visit was on 16 for consultation of Meniere's. She was having increasing flares resulting in difficulty working.   She has been seen by Dr. Rm in past for Meniere's and was seen previously by several providers for her symptoms. She had a 2 year hx (4 year now) with dizzy attacks, increasing tinnitus, aural fullness.   At her visit, she was started on Dyazide, low sodium diet, hearing aid for left ear, and vestibular therapy. She did not complete Vestibular therapy recently.     Today, she returns to ENT for concerns regarding increase in her symptoms. She has used Meclizine, Valium in the past with improvement.   Hearing remains stable. However reports increase in dizziness.   She has been seen Dr. Sanford for steroid injections. She had completed a series of injections without relief. He injected intratympanic series of 3. First one was in improvement. She felt no improvement with the following 2. Completed one year ago, 2017.    She has increase in aural fullness, pressure, tinnitus, vertigo. She has increase in mini attacks. No drops attacks.   Her hearing is stable per patient compared to her recent audiogram.   She has noted increase in right ear at times. No nausea or emesis. Fluctuating  hearing.   She has been using meclizine, Valium. She is taking Valium 10 mg 2-3 tablets daily/ during the flares. She notes symptoms worse the last few weeks/ months she has been using it more.   She has increased in stress at this time, moving. She drinks 1 pop daily. Currently 1/2-1 pack per day. ETOH- Rare.   She does have high anxiety and depression has been increasing over the last years. She has noticed increase especially since not working related to her Meniere's.  She has been following a low sodium diet, currently on SNAP. No formal dietary consult.   She does not tolerate po Prednisone, describes increase in moods. Patient did develop similar symptoms with use of injected steroids.       Audiogram- 2/7/18  Type As.   Thresholds- Right- Normal hearing. Left- Severe rising to slight sloping to moderate mixed loss.     Patient was seen by Dr. Carmen  On 3/20/18 for LATASHA consult. She had fairly normal NPSG. No diagnosis of LATASHA given.       Allergies- Spring/ Winter.   No prior allergy testing. Currently taking Allegra daily for 1 year. 1/ week.       Current Outpatient Rx   Medication Sig Dispense Refill     fluticasone (FLONASE) 50 MCG/ACT spray Spray 1-2 sprays into both nostrils daily 1 Bottle 0     guaiFENesin-codeine (ROBITUSSIN AC) 100-10 MG/5ML SOLN solution Take 5 mLs by mouth every 6 hours as needed for cough 120 mL 0     IBUPROFEN PO Take 800 mg by mouth every 8 hours       HydrOXYzine HCl (ATARAX OR) Take 25 mg by mouth every 8 hours as needed       triamcinolone (KENALOG) 0.025 % cream Apply 1 applicator topically 2 times daily       PARoxetine HCl (PAXIL PO) Take 10 mg by mouth daily       FEXOFENADINE HCL PO Take 180 mg by mouth daily       Lansoprazole (PREVACID PO)        triamterene-hydrochlorothiazide (DYAZIDE) 37.5-25 MG per capsule Take 1 capsule by mouth daily 30 capsule 11     diazepam (VALIUM) 5 MG tablet Take 1 tablet (5 mg) by mouth every 6 hours as needed for anxiety, sleep or other  "(dizziness) 20 tablet 0     meclizine (MEDI-MECLIZINE) 25 MG tablet Take 2 tablets (50 mg) by mouth 3 times daily as needed for dizziness 90 tablet 0       Allergies: Seasonal     Past Medical History:   Diagnosis Date     Bilateral sensorineural hearing loss      Dysfunctional uterine bleeding      GERD (gastroesophageal reflux disease)      Hearing problem      Meniere's disease      Menorrhagia with irregular cycle      Myalgia      Restless legs syndrome      Tinnitus      Tobacco use disorder      Unsteady gait        No past surgical history on file.    ENT family history reviewed    Social History   Substance Use Topics     Smoking status: Current Every Day Smoker     Packs/day: 1.00     Years: 10.00     Types: Cigarettes     Start date: 2/5/2001     Smokeless tobacco: Never Used     Alcohol use 0.0 oz/week      Comment: a couple times per month       Review of Systems  ROS: 10 point ROS neg other than the symptoms noted above in the HPI     Physical Exam  /76 (Cuff Size: Adult Large)  Pulse 82  Temp 97.6  F (36.4  C) (Tympanic)  Ht 5' 6\" (1.676 m)  Wt 290 lb (131.5 kg)  SpO2 98%  BMI 46.81 kg/m2    General - The patient is well nourished and well developed, and appears to have good nutritional status.  Alert and oriented to person and place, answers questions and cooperates with examination appropriately.   Head and Face - Normocephalic and atraumatic, with no gross asymmetry noted.  The facial nerve is intact, with strong symmetric movements.  Voice and Breathing - The patient was breathing comfortably without the use of accessory muscles. There was no wheezing, stridor, or stertor.  The patients voice was clear and strong, and had appropriate pitch and quality.  Ears -Ears examined under microscopy. The external auditory canals are with scant cerumen, Removed from canal with cupped forceps.  the tympanic membranes are intact without effusion, retraction or mass.  Bony landmarks are intact. "   Eyes - Extraocular movements intact, and the pupils were reactive to light.  Sclera were not icteric or injected, conjunctiva were pink and moist.  Mouth - Examination of the oral cavity showed pink, healthy oral mucosa. No lesions or ulcerations noted.  The tongue was mobile and midline, and the dentition were in good condition.    Throat - The walls of the oropharynx were smooth, pink, moist, symmetric, and had no lesions or ulcerations.  The tonsillar pillars and soft palate were symmetric.  The uvula was midline on elevation.    Neck - Normal midline excursion of the laryngotracheal complex during swallowing.  Full range of motion on passive movement.  Palpation of the occipital, submental, submandibular, internal jugular chain, and supraclavicular nodes did not demonstrate any abnormal lymph nodes or masses.  Palpation of the thyroid was soft and smooth, with no nodules or goiter appreciated.  The trachea was mobile and midline.  Nose - External contour is symmetric, no gross deflection or scars.  Nasal mucosa is pink and moist with no abnormal mucus.  The septum and turbinates were evaluated:  No polyps, masses, or purulence noted on examination.        ASSESSMENT:    ICD-10-CM    1. Meniere's disease, left H81.02 cetirizine (ZYRTEC) 10 MG tablet     diazepam (VALIUM) 5 MG tablet     NUTRITION REFERRAL   2. ASNHL (asymmetrical sensorineural hearing loss) H90.5    3. Vertigo R42    4. Seasonal allergic rhinitis, unspecified chronicity, unspecified trigger J30.2 cetirizine (ZYRTEC) 10 MG tablet   5. Anxiety F41.9 MENTAL HEALTH REFERRAL  - Adult; Psychiatry and Medication Management; Psychiatry; Range: [facility] [phone]; We will contact you to schedule the appointment or please call with any questions   6. Depression, unspecified depression type F32.9 MENTAL HEALTH REFERRAL  - Adult; Psychiatry and Medication Management; Psychiatry; Range: [facility] [phone]; We will  contact you to schedule the appointment or please call with any questions     Release records. Needs ENT visits/ MRI.  Consider tube placement with Decadron compound from U. However, Patient may not tolerate due to side effects.   Decrease tobacco. Tobacco cessation strongly encouraged!  Low sodium diet. Dietary Consult.   Start Zyrtec 10 mg one tablet at night. Hold for 5 days before allergy testing.   Complete allergy testing.   Schedule consult with Dr Triplett/ or TYRONE Andersen for anxiety/ depression.   Reviewed with Patient, we need to achieve improvement with her depression/ anxiety, sodium intake 7212-1320 mg/day, allergies, etc in hopes to reduce her symptoms. If there is no improvement, consider consult at the [facility] for surgical options.     Consider trial of Michael Med rinse.   I spent the remainder of today's visit educating the patient about the current theory on the cause of Meniere's Disease and the reasoning behind its treatment.  We discussed the CATS (Caffeine, Alcohol, Tobacco, Salt/Stress) Avoidance Diet, as well as safety measures to be done at home to avoid injury.  Finally, the variability, potential permanent hearing loss, and natural course of Meniere's disease, including 30% incidence of eventual bilateral involvement, was also discussed.    It takes 20 years of quitting tobacco to be at same risk of developing head and neck cancer as a never smoker.  Every year of cessation offers health benefits. This was discussed with the patient today and they voiced understanding.  Tobacco cessation was strongly encouraged.    Indications for allergy testing include:   1) Confirm suspicion of allergic rhinitis due to inhalant allergies  2) Identify the offending allergen to determine specific mode of treatment  3) In the case of chronic rhinosinusitis: when symptoms are not controlled by avoidance and pharmacotherapy  4) In the Asthma patient when exacerbations may be due to perennial allergen  exposure  5) Suspect food allergy  6) Otitis Media, chronic rhinitis, atopic dermatitis, Meniere disease, headache, pharyngitis or eye symptoms    Due to symptoms, modified quantitative testing (MQT) will be performed.  Signed consent was obtained, and the risks of immunotherapy were discussed, including the potential for anaphylaxis.    If immunotherapy (IT) is recommended, there is continued risk of anaphylaxis.   Anaphylaxis can cause death. The patient will need to be monitored for 30 minutes post injection.  They must present their epinephrine pen prior to injection.  Subcutaneous as well as sublingual immunotherapy (SLIT) were discussed as potential treatment options.  The patient was told SLIT is not approved by the FDA and is cash pay.  The general time frame of immunotherapy was discussed (generally 3-5 years, sometimes longer), and the basic immunology behind IT was discussed.    Aurora Loja PA-C  [facility]  [phone]      Again, thank you for allowing me to participate in the care of your patient.        Sincerely,        Aurora Loja PA-C

## 2018-03-28 NOTE — PROGRESS NOTES
Otolaryngology Consultation    Patient: Obdulia Rey  : 1985    Chief Complaint   Patient presents with     Consult     Meniere's; Self Referral---The patient has had an increase in issues over the last 2 weeks. She was given Valium and Meclizine in the past          HPI:  Obdulia Rey is a 32 year old female seen today for Meniere's Disease of left ear. This is her first visit to ENT in [place] and was previously seen at the [facility] ENT. ENT visit was on 16 for consultation of Meniere's. She was having increasing flares resulting in difficulty working.   She has been seen by Dr. Rm in past for Meniere's and was seen previously by several providers for her symptoms. She had a 2 year hx (4 year now) with dizzy attacks, increasing tinnitus, aural fullness.   At her visit, she was started on Dyazide, low sodium diet, hearing aid for left ear, and vestibular therapy. She did not complete Vestibular therapy recently.     Today, she returns to ENT for concerns regarding increase in her symptoms. She has used Meclizine, Valium in the past with improvement.   Hearing remains stable. However reports increase in dizziness.   She has been seen Dr. Sanford for steroid injections. She had completed a series of injections without relief. He injected intratympanic series of 3. First one was in improvement. She felt no improvement with the following 2. Completed one year ago, 2017.    She has increase in aural fullness, pressure, tinnitus, vertigo. She has increase in mini attacks. No drops attacks.   Her hearing is stable per patient compared to her recent audiogram.   She has noted increase in right ear at times. No nausea or emesis. Fluctuating hearing.   She has been using meclizine, Valium. She is taking Valium 10 mg 2-3 tablets daily/ during the flares. She notes symptoms worse the last few weeks/ months she has been using it more.   She has increased in stress at this time, moving.  She drinks 1 pop daily. Currently 1/2-1 pack per day. ETOH- Rare.   She does have high anxiety and depression has been increasing over the last years. She has noticed increase especially since not working related to her Meniere's.  She has been following a low sodium diet, currently on SNAP. No formal dietary consult.   She does not tolerate po Prednisone, describes increase in moods. Patient did develop similar symptoms with use of injected steroids.       Audiogram- 2/7/18  Type As.   Thresholds- Right- Normal hearing. Left- Severe rising to slight sloping to moderate mixed loss.     Patient was seen by Dr. Carmen  On 3/20/18 for LATASHA consult. She had fairly normal NPSG. No diagnosis of LATASHA given.       Allergies- Spring/ Winter.   No prior allergy testing. Currently taking Allegra daily for 1 year. 1/ week.       Current Outpatient Rx   Medication Sig Dispense Refill     fluticasone (FLONASE) 50 MCG/ACT spray Spray 1-2 sprays into both nostrils daily 1 Bottle 0     guaiFENesin-codeine (ROBITUSSIN AC) 100-10 MG/5ML SOLN solution Take 5 mLs by mouth every 6 hours as needed for cough 120 mL 0     IBUPROFEN PO Take 800 mg by mouth every 8 hours       HydrOXYzine HCl (ATARAX OR) Take 25 mg by mouth every 8 hours as needed       triamcinolone (KENALOG) 0.025 % cream Apply 1 applicator topically 2 times daily       PARoxetine HCl (PAXIL PO) Take 10 mg by mouth daily       FEXOFENADINE HCL PO Take 180 mg by mouth daily       Lansoprazole (PREVACID PO)        triamterene-hydrochlorothiazide (DYAZIDE) 37.5-25 MG per capsule Take 1 capsule by mouth daily 30 capsule 11     diazepam (VALIUM) 5 MG tablet Take 1 tablet (5 mg) by mouth every 6 hours as needed for anxiety, sleep or other (dizziness) 20 tablet 0     meclizine (MEDI-MECLIZINE) 25 MG tablet Take 2 tablets (50 mg) by mouth 3 times daily as needed for dizziness 90 tablet 0       Allergies: Seasonal     Past Medical History:   Diagnosis Date     Bilateral  "sensorineural hearing loss      Dysfunctional uterine bleeding      GERD (gastroesophageal reflux disease)      Hearing problem      Meniere's disease      Menorrhagia with irregular cycle      Myalgia      Restless legs syndrome      Tinnitus      Tobacco use disorder      Unsteady gait        No past surgical history on file.    ENT family history reviewed    Social History   Substance Use Topics     Smoking status: Current Every Day Smoker     Packs/day: 1.00     Years: 10.00     Types: Cigarettes     Start date: 2/5/2001     Smokeless tobacco: Never Used     Alcohol use 0.0 oz/week      Comment: a couple times per month       Review of Systems  ROS: 10 point ROS neg other than the symptoms noted above in the HPI     Physical Exam  /76 (Cuff Size: Adult Large)  Pulse 82  Temp 97.6  F (36.4  C) (Tympanic)  Ht 5' 6\" (1.676 m)  Wt 290 lb (131.5 kg)  SpO2 98%  BMI 46.81 kg/m2    General - The patient is well nourished and well developed, and appears to have good nutritional status.  Alert and oriented to person and place, answers questions and cooperates with examination appropriately.   Head and Face - Normocephalic and atraumatic, with no gross asymmetry noted.  The facial nerve is intact, with strong symmetric movements.  Voice and Breathing - The patient was breathing comfortably without the use of accessory muscles. There was no wheezing, stridor, or stertor.  The patients voice was clear and strong, and had appropriate pitch and quality.  Ears -Ears examined under microscopy. The external auditory canals are with scant cerumen, Removed from canal with cupped forceps.  the tympanic membranes are intact without effusion, retraction or mass.  Bony landmarks are intact.   Eyes - Extraocular movements intact, and the pupils were reactive to light.  Sclera were not icteric or injected, conjunctiva were pink and moist.  Mouth - Examination of the oral cavity showed pink, healthy oral mucosa. No lesions or " ulcerations noted.  The tongue was mobile and midline, and the dentition were in good condition.    Throat - The walls of the oropharynx were smooth, pink, moist, symmetric, and had no lesions or ulcerations.  The tonsillar pillars and soft palate were symmetric.  The uvula was midline on elevation.    Neck - Normal midline excursion of the laryngotracheal complex during swallowing.  Full range of motion on passive movement.  Palpation of the occipital, submental, submandibular, internal jugular chain, and supraclavicular nodes did not demonstrate any abnormal lymph nodes or masses.  Palpation of the thyroid was soft and smooth, with no nodules or goiter appreciated.  The trachea was mobile and midline.  Nose - External contour is symmetric, no gross deflection or scars.  Nasal mucosa is pink and moist with no abnormal mucus.  The septum and turbinates were evaluated:  No polyps, masses, or purulence noted on examination.        ASSESSMENT:    ICD-10-CM    1. Meniere's disease, left H81.02 cetirizine (ZYRTEC) 10 MG tablet     diazepam (VALIUM) 5 MG tablet     NUTRITION REFERRAL   2. ASNHL (asymmetrical sensorineural hearing loss) H90.5    3. Vertigo R42    4. Seasonal allergic rhinitis, unspecified chronicity, unspecified trigger J30.2 cetirizine (ZYRTEC) 10 MG tablet   5. Anxiety F41.9 MENTAL HEALTH REFERRAL  - Adult; Psychiatry and Medication Management; Psychiatry; Range: [facility] [phone]; We will contact you to schedule the appointment or please call with any questions   6. Depression, unspecified depression type F32.9 MENTAL HEALTH REFERRAL  - Adult; Psychiatry and Medication Management; Psychiatry; Range: [facility] [phone]; We will contact you to schedule the appointment or please call with any questions     Release records. Needs ENT visits/ MRI.  Consider tube placement with Decadron compound from U. However, Patient may not tolerate due to side effects.    Decrease tobacco. Tobacco cessation strongly encouraged!  Low sodium diet. Dietary Consult.   Start Zyrtec 10 mg one tablet at night. Hold for 5 days before allergy testing.   Complete allergy testing.   Schedule consult with Dr Triplett/ or TYRONE Andersen for anxiety/ depression.   Reviewed with Patient, we need to achieve improvement with her depression/ anxiety, sodium intake 2161-4394 mg/day, allergies, etc in hopes to reduce her symptoms. If there is no improvement, consider consult at the [facility] for surgical options.     Consider trial of Michael Med rinse.   I spent the remainder of today's visit educating the patient about the current theory on the cause of Meniere's Disease and the reasoning behind its treatment.  We discussed the CATS (Caffeine, Alcohol, Tobacco, Salt/Stress) Avoidance Diet, as well as safety measures to be done at home to avoid injury.  Finally, the variability, potential permanent hearing loss, and natural course of Meniere's disease, including 30% incidence of eventual bilateral involvement, was also discussed.    It takes 20 years of quitting tobacco to be at same risk of developing head and neck cancer as a never smoker.  Every year of cessation offers health benefits. This was discussed with the patient today and they voiced understanding.  Tobacco cessation was strongly encouraged.    Indications for allergy testing include:   1) Confirm suspicion of allergic rhinitis due to inhalant allergies  2) Identify the offending allergen to determine specific mode of treatment  3) In the case of chronic rhinosinusitis: when symptoms are not controlled by avoidance and pharmacotherapy  4) In the Asthma patient when exacerbations may be due to perennial allergen exposure  5) Suspect food allergy  6) Otitis Media, chronic rhinitis, atopic dermatitis, Meniere disease, headache, pharyngitis or eye symptoms    Due to symptoms, modified quantitative testing (MQT) will be performed.  Signed consent  was obtained, and the risks of immunotherapy were discussed, including the potential for anaphylaxis.    If immunotherapy (IT) is recommended, there is continued risk of anaphylaxis.   Anaphylaxis can cause death. The patient will need to be monitored for 30 minutes post injection.  They must present their epinephrine pen prior to injection.  Subcutaneous as well as sublingual immunotherapy (SLIT) were discussed as potential treatment options.  The patient was told SLIT is not approved by the FDA and is cash pay.  The general time frame of immunotherapy was discussed (generally 3-5 years, sometimes longer), and the basic immunology behind IT was discussed.    Aurora Loja PA-C  [facility], [place]  [phone]

## 2018-03-29 ENCOUNTER — MEDICAL CORRESPONDENCE (OUTPATIENT)
Dept: HEALTH INFORMATION MANAGEMENT | Facility: CLINIC | Age: 33
End: 2018-03-29

## 2018-03-31 ENCOUNTER — APPOINTMENT (OUTPATIENT)
Dept: GENERAL RADIOLOGY | Facility: HOSPITAL | Age: 33
End: 2018-03-31
Attending: NURSE PRACTITIONER
Payer: COMMERCIAL

## 2018-03-31 ENCOUNTER — HOSPITAL ENCOUNTER (EMERGENCY)
Facility: HOSPITAL | Age: 33
Discharge: HOME | End: 2018-03-31
Attending: NURSE PRACTITIONER | Admitting: NURSE PRACTITIONER
Payer: COMMERCIAL

## 2018-03-31 VITALS
DIASTOLIC BLOOD PRESSURE: 81 MMHG | SYSTOLIC BLOOD PRESSURE: 134 MMHG | HEART RATE: 76 BPM | TEMPERATURE: 97.3 F | RESPIRATION RATE: 14 BRPM | OXYGEN SATURATION: 99 %

## 2018-03-31 DIAGNOSIS — S40.012A CONTUSION OF LEFT SHOULDER, INITIAL ENCOUNTER: ICD-10-CM

## 2018-03-31 PROCEDURE — G0463 HOSPITAL OUTPT CLINIC VISIT: HCPCS

## 2018-03-31 PROCEDURE — 73060 X-RAY EXAM OF HUMERUS: CPT | Mod: TC,LT

## 2018-03-31 PROCEDURE — 73030 X-RAY EXAM OF SHOULDER: CPT | Mod: TC,LT

## 2018-03-31 PROCEDURE — 99213 OFFICE O/P EST LOW 20 MIN: CPT | Performed by: NURSE PRACTITIONER

## 2018-03-31 ASSESSMENT — ENCOUNTER SYMPTOMS
WOUND: 0
JOINT SWELLING: 0
CONSTITUTIONAL NEGATIVE: 1
NECK PAIN: 0
COLOR CHANGE: 1

## 2018-03-31 NOTE — ED AVS SNAPSHOT
[facility]    [address]    [place] [address]    Phone:  [phone]                                       Obdulia Rey   MRN: [id number]    Department:  [facility]   Date of Visit:  3/31/2018           Patient Information     Date Of Birth          1985        Your diagnoses for this visit were:     Contusion of left shoulder, initial encounter        You were seen by Anastasia Camp NP.      Follow-up Information     Follow up with Kari Longo NP In 5 days.    Specialty:  Nurse Practitioner    Why:  if not improving     Contact information:    [facility]  [address] [facility] [address]  [phone]          Discharge Instructions         Shoulder Contusion  You have a shoulder injury called a contusion. This causes pain, swelling, and sometimes bruising on the skin. You don t have any broken bones. This injury will take from a few days to several weeks to heal, depending on how severe it is. Moderate to severe shoulder contusions are treated with a sling or shoulder immobilizer. Minor contusions can be treated without any special support.  Home care  Follow these tips when caring for yourself at home:    If you were given a sling to use, leave it in place for the time advised by your healthcare provider. If you aren t sure how long to wear it, ask for advice. If the sling becomes loose, adjust it so that your forearm is level with the ground. Your shoulder should feel well supported.    Put an ice pack on the injured area for 20 minutes every 1 to 2 hours the first day. You can make your own ice pack by putting ice cubes in a plastic bag. Wrap the bag in a thin towel. Continue with ice packs 3 to 4 times a day for the next 2 days. Then use the pack as needed to ease pain and swelling.    You may use acetaminophen or ibuprofen to control pain, unless another pain medicine was prescribed. If you have chronic liver or kidney  disease, talk with your healthcare provider before using these medicines. Also talk with your provider if you ve ever had a stomach ulcer or GI bleeding.    Shoulder and elbow joints become stiff if left in a sling for too long. You should start range of motion exercises about 7 to 10 days after the injury. Talk with your provider to find out what type of exercises to do and how soon to start.    Unless your provider told you otherwise, you can take the sling off to shower or bathe.  Follow-up care  Follow up with your healthcare provider if you don t start getting better in the next 5 days.       Your next 10 appointments already scheduled     Apr 09, 2018  1:30 PM CDT   (Arrive by 1:15 PM)   New Nutrition with Anastasia Chowdary RD   [facility] ([facility] )    [address] [place] [id number] [phone]                 Review of your medicines      Our records show that you are taking the medicines listed below. If these are incorrect, please call your family doctor or clinic.        Dose / Directions Last dose taken    ATARAX OR   Dose:  25 mg        Take 25 mg by mouth every 8 hours as needed   Refills:  0        cetirizine 10 MG tablet   Commonly known as:  zyrTEC   Dose:  10 mg   Quantity:  30 tablet        Take 1 tablet (10 mg) by mouth every evening   Refills:  11        diazepam 5 MG tablet   Commonly known as:  VALIUM   Dose:  5 mg   Quantity:  40 tablet        Take 1 tablet (5 mg) by mouth every 8 hours as needed for other (Vertigo)   Refills:  0        FEXOFENADINE HCL PO   Dose:  180 mg        Take 180 mg by mouth daily   Refills:  0        fluticasone 50 MCG/ACT spray   Commonly known as:  FLONASE   Dose:  1-2 spray   Quantity:  1 Bottle        Spray 1-2 sprays into both nostrils daily   Refills:  0        IBUPROFEN PO   Dose:  800 mg        Take 800 mg by mouth every 8 hours   Refills:  0        meclizine 25 MG tablet   Commonly known as:  MEDI-MECLIZINE   Dose:   "50 mg   Quantity:  90 tablet        Take 2 tablets (50 mg) by mouth 3 times daily as needed for dizziness   Refills:  0        PAXIL PO   Dose:  10 mg        Take 10 mg by mouth daily   Refills:  0        triamcinolone 0.025 % cream   Commonly known as:  KENALOG   Dose:  1 applicator        Apply 1 applicator topically 2 times daily   Refills:  0        triamterene-hydrochlorothiazide 37.5-25 MG per capsule   Commonly known as:  DYAZIDE   Dose:  1 capsule        Take 1 capsule by mouth every morning   Refills:  0                Procedures and tests performed during your visit     Humerus XR,  G/E 2 views, left    Sling    XR Shoulder Left G/E 3 Views      Orders Needing Specimen Collection     None      Pending Results     Date and Time Order Name Status Description    3/31/2018 1951 XR Shoulder Left G/E 3 Views In process     3/31/2018 1951 Humerus XR,  G/E 2 views, left In process             Pending Culture Results     No orders found from 3/29/2018 to 4/1/2018.            Thank you for choosing [facility]       Thank you for choosing [facility] for your care. Our goal is always to provide you with excellent care. Hearing back from our patients is one way we can continue to improve our services. Please take a few minutes to complete the written survey that you may receive in the mail after you visit with us. Thank you!        [facility] Information     [facility] lets you send messages to your doctor, view your test results, renew your prescriptions, schedule appointments and more. To sign up, go to [email or web address] . Click on \"Log in\" on the left side of the screen, which will take you to the Welcome page. Then click on \"Sign up Now\" on the right side of the page.     You will be asked to enter the access code listed below, as well as some personal information. Please follow the directions to create your username and password.     Your access code is: [email or web address]  Expires: 4/21/2018 12:44 PM     Your access code " will  in 90 days. If you need help or a new code, please call your [facility] clinic or [phone].        Care EveryWhere ID     This is your Care EveryWhere ID. This could be used by other organizations to access your [facility] medical records  [id number]        Equal Access to Services     AMANDA PUTNAM : Kelly Ruggiero, waaxda luqadaha, qaybta kaalmada kristan, indigo donahue. So [facility] [phone].    ATENCIÓN: Si habla español, tiene a lopez disposición servicios gratuitos de asistencia lingüística. Llame al [phone].    We comply with applicable federal civil rights laws and [place] laws. We do not discriminate on the basis of race, color, national origin, age, disability, sex, sexual orientation, or gender identity.            After Visit Summary       This is your record. Keep this with you and show to your community pharmacist(s) and doctor(s) at your next visit.

## 2018-04-01 NOTE — DISCHARGE INSTRUCTIONS
Shoulder Contusion  You have a shoulder injury called a contusion. This causes pain, swelling, and sometimes bruising on the skin. You don t have any broken bones. This injury will take from a few days to several weeks to heal, depending on how severe it is. Moderate to severe shoulder contusions are treated with a sling or shoulder immobilizer. Minor contusions can be treated without any special support.  Home care  Follow these tips when caring for yourself at home:    If you were given a sling to use, leave it in place for the time advised by your healthcare provider. If you aren t sure how long to wear it, ask for advice. If the sling becomes loose, adjust it so that your forearm is level with the ground. Your shoulder should feel well supported.    Put an ice pack on the injured area for 20 minutes every 1 to 2 hours the first day. You can make your own ice pack by putting ice cubes in a plastic bag. Wrap the bag in a thin towel. Continue with ice packs 3 to 4 times a day for the next 2 days. Then use the pack as needed to ease pain and swelling.    You may use acetaminophen or ibuprofen to control pain, unless another pain medicine was prescribed. If you have chronic liver or kidney disease, talk with your healthcare provider before using these medicines. Also talk with your provider if you ve ever had a stomach ulcer or GI bleeding.    Shoulder and elbow joints become stiff if left in a sling for too long. You should start range of motion exercises about 7 to 10 days after the injury. Talk with your provider to find out what type of exercises to do and how soon to start.    Unless your provider told you otherwise, you can take the sling off to shower or bathe.  Follow-up care  Follow up with your healthcare provider if you don t start getting better in the next 5 days.

## 2018-04-01 NOTE — ED PROVIDER NOTES
History     Chief Complaint   Patient presents with     Shoulder Pain     The history is provided by the patient. No  was used.     Obdulia Rey is a 32 year old female who presents with left shoulder pain after falling on it last night.  Has a history of Ménière's disease, had a few alcoholic beverages and lost her balance causing her to fall into a coffee table directly onto the shoulder.  Is unable to lift her arm without pain, states she can do it but it is painful. She has good movement in her left hand and elbow.     Problem List:    There are no active problems to display for this patient.       Past Medical History:    Past Medical History:   Diagnosis Date     Bilateral sensorineural hearing loss      Dysfunctional uterine bleeding      GERD (gastroesophageal reflux disease)      Hearing problem      Meniere's disease      Menorrhagia with irregular cycle      Myalgia      Restless legs syndrome      Tinnitus      Tobacco use disorder      Unsteady gait        Past Surgical History:    History reviewed. No pertinent surgical history.    Family History:    Family History   Problem Relation Age of Onset     Substance Abuse Father      MENTAL ILLNESS Father      CANCER Father      Depression Father      CANCER Paternal Grandmother      DIABETES Maternal Grandmother      Hypertension Maternal Grandmother      CEREBROVASCULAR DISEASE Maternal Grandmother      HEART DISEASE Maternal Grandfather        Social History:  Marital Status:  Single [1]  Social History   Substance Use Topics     Smoking status: Current Every Day Smoker     Packs/day: 1.00     Years: 10.00     Types: Cigarettes     Start date: 2/5/2001     Smokeless tobacco: Never Used     Alcohol use 0.0 oz/week      Comment: a couple times per month        Medications:      triamterene-hydrochlorothiazide (DYAZIDE) 37.5-25 MG per capsule   cetirizine (ZYRTEC) 10 MG tablet   diazepam (VALIUM) 5 MG tablet   fluticasone  (FLONASE) 50 MCG/ACT spray   IBUPROFEN PO   HydrOXYzine HCl (ATARAX OR)   triamcinolone (KENALOG) 0.025 % cream   PARoxetine HCl (PAXIL PO)   FEXOFENADINE HCL PO   meclizine (MEDI-MECLIZINE) 25 MG tablet         Review of Systems   Constitutional: Negative.    Musculoskeletal: Negative for joint swelling and neck pain.        Left upper arm and shoulder pain with limited range of motion.   Skin: Positive for color change. Negative for wound.       Physical Exam   BP: 134/81  Pulse: 76  Temp: 97.3  F (36.3  C)  Resp: 14  SpO2: 99 %      Physical Exam   Constitutional: She is oriented to person, place, and time. She appears well-developed and well-nourished. No distress.   Neck: Normal range of motion.   Pulmonary/Chest: Effort normal.   Musculoskeletal:        Left shoulder: She exhibits decreased range of motion and tenderness. She exhibits no swelling, no effusion, no crepitus and no deformity.        Left elbow: Normal.        Left hand: Normal.   Neurological: She is alert and oriented to person, place, and time.   Skin: Skin is warm, dry and intact. She is not diaphoretic.   Nursing note and vitals reviewed.      ED Course     ED Course     Procedures          Results for orders placed or performed during the hospital encounter of 03/31/18 (from the past 24 hour(s))   XR Shoulder Left G/E 3 Views    Narrative    PROCEDURE:  XR SHOULDER LT G/E 3 VW    HISTORY: Pain, unable to lift arm d/t pain. Direct impact to shoulder  with fall.;     COMPARISON:  None.    TECHNIQUE:  4 views of the left shoulder were obtained.    FINDINGS:  No fracture or dislocation is identified. The joint spaces  are preserved.        Impression    IMPRESSION: No acute fracture.      ASHLEY ROSALES MD       Medications - No data to display    Assessments & Plan (with Medical Decision Making)     I have reviewed the nursing notes.  I have reviewed the findings, diagnosis, plan and need for follow up with the patient.  XR negative, no  dislocation.   Will treat as a contusion. Placed in a sling.  Given Epic educational materials.     New Prescriptions    No medications on file       Final diagnoses:   Contusion of left shoulder, initial encounter     Advised f/u with PCP in 5-7 days if not improving.   Ice and rest. Take ibuprofen for pain PRN.     3/31/2018   [facility]     Anastasia Camp NP  03/31/18 [id number]

## 2018-04-02 NOTE — TELEPHONE ENCOUNTER
I spoke with the patient today regarding allergy skin testing.    All general instructions are reviewed with the patient.      The patient is made aware of all medications that need to be held prior to testing, and will stop medications as directed per instructions.  The patient verbalized understanding and agree with plan.      Should the patient be given any additional medications prior to the day of testing, they are told to let the provider know that they are going to be allergy tested, so medications that need to be help prior to MQT are not prescribed.      An appointment will be arranged by the [facility] [place] and time.     This message is forwarded to the [facility] to arrange for an appointment. Written instructions are mailed to the patient as well,  by the [facility].  Ana Lilia Perez RN

## 2018-04-09 DIAGNOSIS — H81.02 MENIERE'S DISEASE, LEFT: ICD-10-CM

## 2018-04-12 RX ORDER — DIAZEPAM 5 MG
TABLET ORAL
Qty: 40 TABLET | Refills: 0 | Status: SHIPPED | OUTPATIENT
Start: 2018-04-12

## 2018-04-18 ENCOUNTER — OFFICE VISIT (OUTPATIENT)
Dept: PSYCHIATRY | Facility: OTHER | Age: 33
End: 2018-04-18
Attending: NURSE PRACTITIONER
Payer: COMMERCIAL

## 2018-04-18 VITALS
TEMPERATURE: 97.9 F | OXYGEN SATURATION: 98 % | SYSTOLIC BLOOD PRESSURE: 126 MMHG | DIASTOLIC BLOOD PRESSURE: 72 MMHG | HEART RATE: 99 BPM

## 2018-04-18 DIAGNOSIS — F41.9 ANXIETY: ICD-10-CM

## 2018-04-18 DIAGNOSIS — F32.A DEPRESSION, UNSPECIFIED DEPRESSION TYPE: ICD-10-CM

## 2018-04-18 LAB — MISCELLANEOUS TEST: NORMAL

## 2018-04-18 PROCEDURE — 99213 OFFICE O/P EST LOW 20 MIN: CPT | Performed by: NURSE PRACTITIONER

## 2018-04-18 PROCEDURE — G0463 HOSPITAL OUTPT CLINIC VISIT: HCPCS

## 2018-04-18 ASSESSMENT — ANXIETY QUESTIONNAIRES
1. FEELING NERVOUS, ANXIOUS, OR ON EDGE: NEARLY EVERY DAY
2. NOT BEING ABLE TO STOP OR CONTROL WORRYING: NEARLY EVERY DAY
GAD7 TOTAL SCORE: 17
3. WORRYING TOO MUCH ABOUT DIFFERENT THINGS: NEARLY EVERY DAY
6. BECOMING EASILY ANNOYED OR IRRITABLE: NEARLY EVERY DAY
4. TROUBLE RELAXING: NEARLY EVERY DAY
5. BEING SO RESTLESS THAT IT IS HARD TO SIT STILL: SEVERAL DAYS
IF YOU CHECKED OFF ANY PROBLEMS ON THIS QUESTIONNAIRE, HOW DIFFICULT HAVE THESE PROBLEMS MADE IT FOR YOU TO DO YOUR WORK, TAKE CARE OF THINGS AT HOME, OR GET ALONG WITH OTHER PEOPLE: VERY DIFFICULT
7. FEELING AFRAID AS IF SOMETHING AWFUL MIGHT HAPPEN: SEVERAL DAYS

## 2018-04-18 ASSESSMENT — PAIN SCALES - GENERAL: PAINLEVEL: NO PAIN (0)

## 2018-04-18 NOTE — MR AVS SNAPSHOT
After Visit Summary   4/18/2018    Obdulia Rey    MRN: [id number]           Patient Information     Date Of Birth          1985        Visit Information        Provider Department      4/18/2018 3:00 PM Pilar Peguero APRN CNP [facility]        Today's Diagnoses     Anxiety        Depression, unspecified depression type           Follow-ups after your visit        Your next 10 appointments already scheduled     Apr 25, 2018  1:30 PM CDT   Office Visit with HC ALLERGY TESTING   [facility] ([facility] )    [address] [address]   [phone]           Bring a current list of meds and any records pertaining to this visit. For Physicals, please bring immunization records and any forms needing to be filled out. Please arrive 10 minutes early to complete paperwork.            Apr 25, 2018  3:15 PM CDT   (Arrive by 3:00 PM)   Return Visit with Aurora Loja PA-C   [facility] ([facility] )    [address] [address]   [phone]              Who to contact     If you have questions or need follow up information about today's clinic visit or your schedule please contact [facility] directly at [phone].  Normal or non-critical lab and imaging results will be communicated to you by SureWaveshart, letter or phone within 4 business days after the clinic has received the results. If you do not hear from us within 7 days, please contact the clinic through SureWaveshart or phone. If you have a critical or abnormal lab result, we will notify you by phone as soon as possible.  Submit refill requests through Movaris or call your pharmacy and they will forward the refill request to us. Please allow 3 business days for your refill to be completed.          Additional Information About Your Visit        Movaris Information     Movaris lets you send messages to your doctor, view your test  "results, renew your prescriptions, schedule appointments and more. To sign up, go to [email or web address] . Click on \"Log in\" on the left side of the screen, which will take you to the Welcome page. Then click on \"Sign up Now\" on the right side of the page.     You will be asked to enter the access code listed below, as well as some personal information. Please follow the directions to create your username and password.     Your access code is: [email or web address]  Expires: 2018 12:44 PM     Your access code will  in 90 days. If you need help or a new code, please call your [place] clinic or [phone].        Care EveryWhere ID     This is your Care EveryWhere ID. This could be used by other organizations to access your [place] medical records  [id number]        Your Vitals Were     Pulse Temperature Pulse Oximetry             99 97.9  F (36.6  C) (Tympanic) 98%          Blood Pressure from Last 3 Encounters:   18 126/72   18 134/81   18 118/76    Weight from Last 3 Encounters:   18 290 lb (131.5 kg)   18 290 lb (131.5 kg)   16 (!) 319 lb (144.7 kg)              We Performed the Following     MENTAL HEALTH REFERRAL  - Adult; Psychiatry and Medication Management; Psychiatry; Range: [facility] [phone]; We will contact you to schedule the appointment or please call with any questions        Primary Care Provider Office Phone # Fax #    Kari Longo [phone]       [facility] MEDICINE [address] [facility] [address]        Equal Access to Services     ALYCIA PUTNAM : Hadii abraham Ruggiero, deanda frances, qaybta ericalmaberhane rushing, indigo donahue. So [facility] [phone].    ATENCIÓN: Si habla español, tiene a lopez disposición servicios gratuitos de asistencia lingüística. Llame al [phone].    We comply with applicable federal civil rights laws and [place] laws. We do not " discriminate on the basis of race, color, national origin, age, disability, sex, sexual orientation, or gender identity.            Thank you!     Thank you for choosing [facility]  for your [facility]. Our goal is always to provide you with excellent care. Hearing back from our patients is one way we can continue to improve our services. Please take a few minutes to complete the written survey that you may receive in the mail after your visit with us. Thank you!             Your Updated Medication List - Protect others around you: Learn how to safely use, store and throw away your medicines at www.disposemymeds.org.          This list is accurate as of 4/18/18  3:28 PM.  Always use your most recent med list.                   Brand Name Dispense Instructions for use Diagnosis    ATARAX OR      Take 25 mg by mouth every 8 hours as needed        cetirizine 10 MG tablet    zyrTEC    30 tablet    Take 1 tablet (10 mg) by mouth every evening    Meniere's disease, left, Seasonal allergic rhinitis, unspecified chronicity, unspecified trigger       diazepam 5 MG tablet    VALIUM    40 tablet    TAKE 1 TABLET BY MOUTH EVERY 8 HOURS AS NEEDED FOR  VERTIGO    Meniere's disease, left       FEXOFENADINE HCL PO      Take 180 mg by mouth daily        fluticasone 50 MCG/ACT spray    FLONASE    1 Bottle    Spray 1-2 sprays into both nostrils daily        IBUPROFEN PO      Take 800 mg by mouth every 8 hours        meclizine 25 MG tablet    MEDI-MECLIZINE    90 tablet    Take 2 tablets (50 mg) by mouth 3 times daily as needed for dizziness        PAXIL PO      Take 10 mg by mouth daily        triamcinolone 0.025 % cream    KENALOG     Apply 1 applicator topically 2 times daily        triamterene-hydrochlorothiazide 37.5-25 MG per capsule    DYAZIDE     Take 1 capsule by mouth every morning

## 2018-04-18 NOTE — NURSING NOTE
"Chief Complaint   Patient presents with     Consult     radha Loja       Initial /72 (BP Location: Right arm, Patient Position: Sitting, Cuff Size: Adult Large)  Pulse 99  Temp 97.9  F (36.6  C) (Tympanic)  SpO2 98% Estimated body mass index is 46.81 kg/(m^2) as calculated from the following:    Height as of 3/28/18: 5' 6\" (1.676 m).    Weight as of 3/28/18: 290 lb (131.5 kg).  Medication Reconciliation: complete   Claudia Fall  "

## 2018-04-18 NOTE — PROGRESS NOTES
"PSYCHIATRY CLINIC PROGRESS NOTE   20 minute medication management, more than 50% of time spent counseling patient on medications, medication side effects, symptom history and management   SUBJECTIVE / INTERIM HISTORY                                                                       Obdulia presents alone and says she is \"always agitated\" and \"always crabby,\" and was moving in with her sister and her sister had an MI, and was hospitalized. Her dad just , she is feeling like she has a very low level of tolerance for others, and extreme irritability with herself and others.   She has a family hx of schizophrenia and Bipolar and he was well-medicated and was able to stay sober and in good shape.   Anxiety 10/10, Depression also a 10/10.   She feels like she can't concentrate but she did have no problem getting through high school and was never diagnoses with ADHD.   She has never had a manic episode.  She was dx'd with Meniere's disease about three years ago. It causes a lot of problems with vertigo and tinnitus and balance, she sometimes uses a walker to ambulate.   Obdulia sleeps more during the day because feels better at night with Menieres.   She has thought about suicide on very occasional basis, but never in a serious fashion, \"there are too many people who need me in their lives,\" and she has a good close family.   No AH/VH  No SI/HI  SUBSTANCE USE- no issues, can't drink much with her Meniere's disease,  smokes weed daily     SYMPTOMS- Anxiety, depressed mood, low energy. No SI.   MEDICAL ROS-Meniere's disease.  / Tinnitus.   Nausea from that and hearing loss, vertigo.   MEDICAL / SURGICAL HISTORY                pregnant [if applicable]--no   Medical Team:     PMD-     Therapist-       Patient Active Problem List   Diagnosis     Major depressive disorder     Anxiety                           ALLERGY   Review of patient's allergies indicates      Allergies   Allergen Reactions     Seasonal  "       MEDICATIONS                                                                                                   Current Outpatient Prescriptions   Medication Sig       Current Outpatient Prescriptions:      cetirizine (ZYRTEC) 10 MG tablet, Take 1 tablet (10 mg) by mouth every evening, Disp: 30 tablet, Rfl: 11     diazepam (VALIUM) 5 MG tablet, TAKE 1 TABLET BY MOUTH EVERY 8 HOURS AS NEEDED FOR  VERTIGO, Disp: 40 tablet, Rfl: 0     FEXOFENADINE HCL PO, Take 180 mg by mouth daily, Disp: , Rfl:      HydrOXYzine HCl (ATARAX OR), Take 25 mg by mouth every 8 hours as needed, Disp: , Rfl:      IBUPROFEN PO, Take 800 mg by mouth every 8 hours, Disp: , Rfl:      meclizine (MEDI-MECLIZINE) 25 MG tablet, Take 2 tablets (50 mg) by mouth 3 times daily as needed for dizziness, Disp: 90 tablet, Rfl: 0     PARoxetine HCl (PAXIL PO), Take 10 mg by mouth daily, Disp: , Rfl:      triamcinolone (KENALOG) 0.025 % cream, Apply 1 applicator topically 2 times daily, Disp: , Rfl:      triamterene-hydrochlorothiazide (DYAZIDE) 37.5-25 MG per capsule, Take 1 capsule by mouth every morning, Disp: , Rfl:      fluticasone (FLONASE) 50 MCG/ACT spray, Spray 1-2 sprays into both nostrils daily (Patient not taking: Reported on 4/18/2018), Disp: 1 Bottle, Rfl: 0                                               No current facility-administered medications for this visit.          VITALS    /72 (BP Location: Right arm, Patient Position: Sitting, Cuff Size: Adult Large)  Pulse 99  Temp 97.9  F (36.6  C) (Tympanic)  SpO2 98%    PHQ9                        PHQ-9 SCORE date date date   Total Score - - -   Total Score 1 1          MENTAL STATUS EXAM                                                                                        Alert. Oriented to person, place, and date / time. Well groomed, calm, cooperative with good eye contact. No problems with speech or psychomotor behavior. Mood was euthymic and affect congruent to speech content and  full range. Thought process, including associations, was unremarkable and thought content was devoid of suicidal and homicidal ideation and psychotic thought. No hallucinations. Insight was good. Judgment was intact and adequate for safety. Fund of knowledge was intact. Pt demonstrates no obvious problems with attention, concentration, language, recent or remote memory although these were not formally tested.      ASSESSMENT                                                                                                       HISTORICAL:  Initial psych consult       CURRENT: This patient provides a history which supports the diagnoses Anxiety, depressed mood but no psychotic features.      TREATMENT RISK STATEMENT: The risks, benefits, alternatives and potential adverse effects have been explained and are understood by the pt. The pt agrees to the treatment plan with the ability to do so. The pt knows to call the clinic for any problems or access emergency care if needed.    DIAGNOSES            (F41.9) Anxiety      (F32.9) Depression, unspecified depression type         LABS      Transferred Records on 01/22/2018   Component Date Value Ref Range Status     Potassium 01/22/2018 4.2  3.5 - 5.1 mEq/L Final     Glucose 01/22/2018 95  60 - 99 mg/dL Final     Creatinine 01/22/2018 0.85  0.70 - 1.20 mg/dL Final     AST 01/22/2018 9* 10 - 30 U/L Final     ALT 01/22/2018 21  18 - 65 U/L Final     Cholesterol 01/22/2018 194  <200 mg/dL Final     Triglycerides 01/22/2018 269* <150 mg/dL Final     HDL Cholesterol 01/22/2018 27* >=60 mg/dL Final     LDL Cholesterol Calculated 01/22/2018 113* <100 mg/dL Corrected     TSH 01/22/2018 1.770  0.350 - 4.800 mIU/mL Final          PLAN                                                                                                                          1) MEDICATIONS:  Will wait for Gene Sight results.      2) THERAPY: No Change. She accepts a list of therapists in the area.    3)  LABS: none ordered     4) PT MONITOR [call for probs]: Worsening symptoms, side effects from medications, SI/HI     5) REFERRALS [CD tx, medical, tests other]:      6)  RTC: 2 months

## 2018-04-19 ASSESSMENT — PATIENT HEALTH QUESTIONNAIRE - PHQ9: SUM OF ALL RESPONSES TO PHQ QUESTIONS 1-9: 20

## 2018-04-19 ASSESSMENT — ANXIETY QUESTIONNAIRES: GAD7 TOTAL SCORE: 17

## 2018-04-23 ENCOUNTER — TELEPHONE (OUTPATIENT)
Dept: PSYCHIATRY | Facility: OTHER | Age: 33
End: 2018-04-23

## 2018-04-23 NOTE — TELEPHONE ENCOUNTER
2:01 PM    Reason for Call: Phone Call    Description: Pt called and stated she saw Pilar and had a cheek swab done and then provider was suppose to call in meds. She has not received any. Please call her back at [phone]    Was an appointment offered for this call? No  If yes : Appointment type              Date    Preferred method for responding to this message: Telephone Call  What is your phone number ?    If we cannot reach you directly, may we leave a detailed response at the number you provided? Yes    Can this message wait until your PCP/provider returns, if available today? Not applicable    Cyndi Luna

## 2018-04-27 NOTE — TELEPHONE ENCOUNTER
Patient called again today to get test results.  Told patient that Pilar and nurse out of the office today. I would see if the the nurse can call her on Monday.    Lynnette please call patient   Thank you

## 2018-04-30 DIAGNOSIS — F33.0 MAJOR DEPRESSIVE DISORDER, RECURRENT EPISODE, MILD (H): Primary | ICD-10-CM

## 2018-04-30 RX ORDER — VILAZODONE HYDROCHLORIDE 10 MG/1
10 TABLET ORAL DAILY
Qty: 30 TABLET | Refills: 0 | Status: SHIPPED | OUTPATIENT
Start: 2018-04-30 | End: 2018-05-06

## 2018-04-30 NOTE — TELEPHONE ENCOUNTER
Called pt; she wanted to know if LuxTicket.sgight result is in.  If she doesn't need to come in for a follow-up/result appt, [facility]  Will message Nathaly.  Printed VetCloud report for chart.  Claudia Fall

## 2018-04-30 NOTE — TELEPHONE ENCOUNTER
Please call patient and say that per her results Viibryd should have no genetic interference, and see whether she wants to take it? She would need to  taper the Paxil slowly, Like cut it in half for three days, see if she feels ok, then cut the half in half, etc.  Please let me know which pharmacy she wants for the Viibryd if she wants it? Thanks!

## 2018-04-30 NOTE — TELEPHONE ENCOUNTER
Called pt; went over plan of weaning the Paxil.  She is anxious to begin the Viibryd; but will wean the Paxil.  [facility].  Claudia Fall

## 2018-05-03 DIAGNOSIS — F33.0 MAJOR DEPRESSIVE DISORDER, RECURRENT EPISODE, MILD (H): Primary | ICD-10-CM

## 2018-05-03 NOTE — PROGRESS NOTES
Insurance will not cover Viibryd,  But will cover Zoloft,  so Obdulia has finished tapering on her Paxil and will start Zoloft today.

## 2018-05-06 ENCOUNTER — HOSPITAL ENCOUNTER (EMERGENCY)
Facility: HOSPITAL | Age: 33
Discharge: HOME | End: 2018-05-07
Attending: INTERNAL MEDICINE | Admitting: INTERNAL MEDICINE
Payer: COMMERCIAL

## 2018-05-06 DIAGNOSIS — N93.8 DUB (DYSFUNCTIONAL UTERINE BLEEDING): ICD-10-CM

## 2018-05-06 PROCEDURE — 99283 EMERGENCY DEPT VISIT LOW MDM: CPT

## 2018-05-06 PROCEDURE — 99283 EMERGENCY DEPT VISIT LOW MDM: CPT | Performed by: INTERNAL MEDICINE

## 2018-05-06 NOTE — ED AVS SNAPSHOT
[facility]    [address] [address]    Phone:  [phone]                                       Obdulia Rey   MRN: [id number]    Department:  [facility]   Date of Visit:  5/6/2018           Patient Information     Date Of Birth          1985        Your diagnoses for this visit were:     DUB (dysfunctional uterine bleeding)        You were seen by Todd Josue MD.      Follow-up Information     Schedule an appointment as soon as possible for a visit with Kari Longo NP.    Specialty:  Nurse Practitioner    Contact information:    [facility]  [address] [facility] [id number] [phone]          Discharge Instructions         Dysfunctional Uterine Bleeding    Dysfunctional uterine bleeding, also called abnormal uterine bleeding, is a condition in which bleeding is abnormal and occurs at unexpected times of the month. This happens because of changes in the hormones that help control a woman s menstrual cycle each month.  The bleeding may be heavier or lighter than normal. If you have heavy bleeding often, this can lead to a problem called anemia. With anemia, your red blood cell count is too low. Red blood cells help carry oxygen throughout your body. Severe anemia may cause you to look pale and feel very weak or tired. You might also become short of breath easily.  To treat dysfunctional uterine bleeding, medicines are often tried first. If these don t help, or if you have additional symptoms or have reached menopause, further testing and treatments may be needed. Discuss all of your options with your provider.  Home care  Medicines  If you re prescribed medicines, be sure to take them as directed. Some of the more common medicines you may be prescribed include:    Hormone therapy (Options include most methods of hormonal birth control such as pills, shots, or a hormone-releasing IUD)    Nonsteroidal anti-inflammatory drugs  (NSAIDs), such as ibuprofen    Iron supplements, if you have anemia     General care    Get plenty of rest if you tire easily. Avoid heavy exertion.    To help relieve pain or cramping that may occur with bleeding, try using a heating pad on the lower belly or back. A warm bath may also help.  Follow-up care  Follow up with your healthcare provider, or as directed.  When to seek medical advice  Call your healthcare provider right away if:    Bleeding becomes heavy (soaking 1 pad or tampon every hour for 3 hours)    Increased abdominal pain    Irregular bleeding worsens or does not get better even with treatment    Fever of 100.4 F (38 C) or higher, or as directed by your provider    Signs of anemia, such as pale skin, extreme fatigue or weakness, or shortness of breath    Dizziness or fainting   Date Last Reviewed: 11/1/2017 2000-2017 [employer]. [address] [place] [address]. All rights reserved. This information is not intended as a substitute for professional medical care. Always follow your healthcare professional's instructions.          Your next 10 appointments already scheduled     Jun 20, 2018  1:30 PM CDT   Office Visit with HC ALLERGY TESTING   [facility] )    [address] [address]   [phone]           Bring a current list of meds and any records pertaining to this visit. For Physicals, please bring immunization records and any forms needing to be filled out. Please arrive 10 minutes early to complete paperwork.            Jun 20, 2018  3:30 PM CDT   (Arrive by 3:15 PM)   Return Visit with N[facility] )    [address] [address]   [phone]                 Review of your medicines      Our records show that you are taking the medicines listed below. If these are incorrect, please call your family doctor or clinic.         "Dose / Directions Last dose taken    cetirizine 10 MG tablet   Commonly known as:  zyrTEC   Dose:  10 mg   Quantity:  30 tablet        Take 1 tablet (10 mg) by mouth every evening   Refills:  11        diazepam 5 MG tablet   Commonly known as:  VALIUM   Quantity:  40 tablet        TAKE 1 TABLET BY MOUTH EVERY 8 HOURS AS NEEDED FOR  VERTIGO   Refills:  0        IBUPROFEN PO   Dose:  800 mg        Take 800 mg by mouth every 8 hours   Refills:  0        meclizine 25 MG tablet   Commonly known as:  MEDI-MECLIZINE   Dose:  50 mg   Quantity:  90 tablet        Take 2 tablets (50 mg) by mouth 3 times daily as needed for dizziness   Refills:  0        sertraline 50 MG tablet   Commonly known as:  ZOLOFT   Dose:  50 mg   Quantity:  90 tablet        Take 1 tablet (50 mg) by mouth daily   Refills:  3        triamterene-hydrochlorothiazide 37.5-25 MG per capsule   Commonly known as:  DYAZIDE   Dose:  1 capsule        Take 1 capsule by mouth every morning   Refills:  0                Procedures and tests performed during your visit     CBC with platelets differential    HCG qualitative urine      Orders Needing Specimen Collection     None      Pending Results     No orders found for last 3 day(s).            Pending Culture Results     No orders found for last 3 day(s).            Thank you for choosing [facility]       Thank you for choosing [facility] for your care. Our goal is always to provide you with excellent care. Hearing back from our patients is one way we can continue to improve our services. Please take a few minutes to complete the written survey that you may receive in the mail after you visit with us. Thank you!        [email or web address] Information     Applause lets you send messages to your doctor, view your test results, renew your prescriptions, schedule appointments and more. To sign up, go to [email or web address] . Click on \"Log in\" on the left side of the screen, which will take you to the Welcome page. Then click on " "\"Sign up Now\" on the right side of the page.     You will be asked to enter the access code listed below, as well as some personal information. Please follow the directions to create your username and password.     Your access code is: [email or web address]  Expires: 2018  1:28 AM     Your access code will  in 90 days. If you need help or a new code, please call your [place] clinic or [phone].        Care EveryWhere ID     This is your Care EveryWhere ID. This could be used by other organizations to access your [place] medical records  [id number]        Equal Access to Services     [facility] : Kelly Ruggiero, abimael daniels, mariam rushing, indigo delgado . So [facility] [phone].    ATENCIÓN: Si habla español, tiene a lopez disposición servicios gratuitos de asistencia lingüística. Llame al [phone].    We comply with applicable federal civil rights laws and [place] laws. We do not discriminate on the basis of race, color, national origin, age, disability, sex, sexual orientation, or gender identity.            After Visit Summary       This is your record. Keep this with you and show to your community pharmacist(s) and doctor(s) at your next visit.                  "

## 2018-05-06 NOTE — ED AVS SNAPSHOT
[facility]    [address] [address]    Phone:  [phone]                                       Obdulia Rey   MRN: [id number]    Department:  [facility]   Date of Visit:  5/6/2018           After Visit Summary Signature Page     I have received my discharge instructions, and my questions have been answered. I have discussed any challenges I see with this plan with the nurse or doctor.    ..........................................................................................................................................  Patient/Patient Representative Signature      ..........................................................................................................................................  Patient Representative Print Name and Relationship to Patient    ..................................................               ................................................  Date                                            Time    ..........................................................................................................................................  Reviewed by Signature/Title    ...................................................              ..............................................  Date                                                            Time

## 2018-05-07 VITALS
HEART RATE: 98 BPM | OXYGEN SATURATION: 98 % | SYSTOLIC BLOOD PRESSURE: 114 MMHG | DIASTOLIC BLOOD PRESSURE: 83 MMHG | TEMPERATURE: 98.2 F | RESPIRATION RATE: 18 BRPM

## 2018-05-07 LAB
BASOPHILS # BLD AUTO: 0.1 10E9/L (ref 0–0.2)
BASOPHILS NFR BLD AUTO: 0.8 %
DIFFERENTIAL METHOD BLD: ABNORMAL
EOSINOPHIL # BLD AUTO: 0.3 10E9/L (ref 0–0.7)
EOSINOPHIL NFR BLD AUTO: 2.2 %
ERYTHROCYTE [DISTWIDTH] IN BLOOD BY AUTOMATED COUNT: 16.8 % (ref 10–15)
HCG UR QL: NEGATIVE
HCT VFR BLD AUTO: 39.4 % (ref 35–47)
HGB BLD-MCNC: 12 G/DL (ref 11.7–15.7)
IMM GRANULOCYTES # BLD: 0.1 10E9/L (ref 0–0.4)
IMM GRANULOCYTES NFR BLD: 0.4 %
LYMPHOCYTES # BLD AUTO: 4.1 10E9/L (ref 0.8–5.3)
LYMPHOCYTES NFR BLD AUTO: 29.1 %
MCH RBC QN AUTO: 22.9 PG (ref 26.5–33)
MCHC RBC AUTO-ENTMCNC: 30.5 G/DL (ref 31.5–36.5)
MCV RBC AUTO: 75 FL (ref 78–100)
MONOCYTES # BLD AUTO: 1 10E9/L (ref 0–1.3)
MONOCYTES NFR BLD AUTO: 7 %
NEUTROPHILS # BLD AUTO: 8.4 10E9/L (ref 1.6–8.3)
NEUTROPHILS NFR BLD AUTO: 60.5 %
NRBC # BLD AUTO: 0 10*3/UL
NRBC BLD AUTO-RTO: 0 /100
PLATELET # BLD AUTO: 435 10E9/L (ref 150–450)
RBC # BLD AUTO: 5.23 10E12/L (ref 3.8–5.2)
WBC # BLD AUTO: 13.9 10E9/L (ref 4–11)

## 2018-05-07 PROCEDURE — 81025 URINE PREGNANCY TEST: CPT | Performed by: INTERNAL MEDICINE

## 2018-05-07 PROCEDURE — 85025 COMPLETE CBC W/AUTO DIFF WBC: CPT | Performed by: INTERNAL MEDICINE

## 2018-05-07 PROCEDURE — 36415 COLL VENOUS BLD VENIPUNCTURE: CPT | Performed by: INTERNAL MEDICINE

## 2018-05-07 ASSESSMENT — ENCOUNTER SYMPTOMS
ABDOMINAL PAIN: 0
LIGHT-HEADEDNESS: 0
FLANK PAIN: 0
WHEEZING: 0
CHILLS: 0
BLOOD IN STOOL: 0
NUMBNESS: 0
BACK PAIN: 0
DIZZINESS: 1
WOUND: 0
VOMITING: 0
HEADACHES: 0
MYALGIAS: 0
ABDOMINAL DISTENTION: 0
DYSURIA: 0
NECK PAIN: 0
COUGH: 0
DIAPHORESIS: 0
ARTHRALGIAS: 0
ANAL BLEEDING: 0
NECK STIFFNESS: 0
COLOR CHANGE: 0
SHORTNESS OF BREATH: 0
CONFUSION: 0
CHEST TIGHTNESS: 0
NAUSEA: 0
VOICE CHANGE: 0
PALPITATIONS: 0
FEVER: 0
HEMATURIA: 0

## 2018-05-07 NOTE — ED PROVIDER NOTES
"  History     Chief Complaint   Patient presents with     Vaginal Bleeding     stating she has been having abnormally heavy periods. last period . bleeding started . stating that she might be pregnant and in concerned that the bleeding is heavier. cramping noted. stating \"I almost  from my period before; I lost all my blood.\"      The history is provided by the patient.     Obdulia Rey is a 32 year old female who came for heavy period, concerned if she is pregnant.     Problem List:    There are no active problems to display for this patient.       Past Medical History:    Past Medical History:   Diagnosis Date     Bilateral sensorineural hearing loss      Dysfunctional uterine bleeding      GERD (gastroesophageal reflux disease)      Hearing problem      Meniere's disease      Menorrhagia with irregular cycle      Myalgia      Restless legs syndrome      Tinnitus      Tobacco use disorder      Unsteady gait        Past Surgical History:    History reviewed. No pertinent surgical history.    Family History:    Family History   Problem Relation Age of Onset     Substance Abuse Father      MENTAL ILLNESS Father      CANCER Father      Depression Father      CANCER Paternal Grandmother      DIABETES Maternal Grandmother      Hypertension Maternal Grandmother      CEREBROVASCULAR DISEASE Maternal Grandmother      HEART DISEASE Maternal Grandfather        Social History:  Marital Status:  Single [1]  Social History   Substance Use Topics     Smoking status: Current Every Day Smoker     Packs/day: 1.00     Years: 10.00     Types: Cigarettes     Start date: 2001     Smokeless tobacco: Never Used     Alcohol use 0.0 oz/week      Comment: a couple times per month        Medications:      cetirizine (ZYRTEC) 10 MG tablet   diazepam (VALIUM) 5 MG tablet   IBUPROFEN PO   sertraline (ZOLOFT) 50 MG tablet   triamterene-hydrochlorothiazide (DYAZIDE) 37.5-25 MG per capsule   meclizine (MEDI-MECLIZINE) 25 MG " tablet         Review of Systems   Constitutional: Negative for chills, diaphoresis and fever.   HENT: Negative for voice change.    Eyes: Negative for visual disturbance.   Respiratory: Negative for cough, chest tightness, shortness of breath and wheezing.    Cardiovascular: Negative for chest pain, palpitations and leg swelling.   Gastrointestinal: Negative for abdominal distention, abdominal pain, anal bleeding, blood in stool, nausea and vomiting.   Genitourinary: Positive for vaginal bleeding. Negative for decreased urine volume, dysuria, flank pain, hematuria, vaginal discharge and vaginal pain.   Musculoskeletal: Negative for arthralgias, back pain, gait problem, myalgias, neck pain and neck stiffness.   Skin: Negative for color change, pallor, rash and wound.   Neurological: Positive for dizziness. Negative for syncope, light-headedness, numbness and headaches.   Psychiatric/Behavioral: Negative for confusion and suicidal ideas.       Physical Exam   BP: 125/76  Pulse: 98  Heart Rate: 98  Temp: 97.6  F (36.4  C)  Resp: 18  SpO2: 97 %      Physical Exam   Constitutional: She is oriented to person, place, and time. She appears well-developed and well-nourished.   HENT:   Head: Normocephalic and atraumatic.   Mouth/Throat: No oropharyngeal exudate.   Eyes: Conjunctivae are normal. Pupils are equal, round, and reactive to light.   Neck: Normal range of motion. Neck supple. No JVD present. No tracheal deviation present. No thyromegaly present.   Cardiovascular: Normal rate, regular rhythm, normal heart sounds and intact distal pulses.  Exam reveals no gallop and no friction rub.    No murmur heard.  Pulmonary/Chest: Effort normal and breath sounds normal. No stridor. No respiratory distress. She has no wheezes. She has no rales. She exhibits no tenderness.   Abdominal: Soft. Bowel sounds are normal. She exhibits no distension and no mass. There is no tenderness. There is no rebound and no guarding.    Musculoskeletal: Normal range of motion. She exhibits no edema or tenderness.   Lymphadenopathy:     She has no cervical adenopathy.   Neurological: She is alert and oriented to person, place, and time.   Skin: Skin is warm and dry. No rash noted. No erythema. No pallor.   Psychiatric: Her behavior is normal.   Nursing note and vitals reviewed.      ED Course     ED Course     Procedures                 Results for orders placed or performed during the hospital encounter of 05/06/18 (from the past 24 hour(s))   HCG qualitative urine   Result Value Ref Range    HCG Qual Urine Negative NEG^Negative   CBC with platelets differential   Result Value Ref Range    WBC 13.9 (H) 4.0 - 11.0 10e9/L    RBC Count 5.23 (H) 3.8 - 5.2 10e12/L    Hemoglobin 12.0 11.7 - 15.7 g/dL    Hematocrit 39.4 35.0 - 47.0 %    MCV 75 (L) 78 - 100 fl    MCH 22.9 (L) 26.5 - 33.0 pg    MCHC 30.5 (L) 31.5 - 36.5 g/dL    RDW 16.8 (H) 10.0 - 15.0 %    Platelet Count 435 150 - 450 10e9/L    Diff Method Automated Method     % Neutrophils 60.5 %    % Lymphocytes 29.1 %    % Monocytes 7.0 %    % Eosinophils 2.2 %    % Basophils 0.8 %    % Immature Granulocytes 0.4 %    Nucleated RBCs 0 0 /100    Absolute Neutrophil 8.4 (H) 1.6 - 8.3 10e9/L    Absolute Lymphocytes 4.1 0.8 - 5.3 10e9/L    Absolute Monocytes 1.0 0.0 - 1.3 10e9/L    Absolute Eosinophils 0.3 0.0 - 0.7 10e9/L    Absolute Basophils 0.1 0.0 - 0.2 10e9/L    Abs Immature Granulocytes 0.1 0 - 0.4 10e9/L    Absolute Nucleated RBC 0.0        Medications - No data to display    Assessments & Plan (with Medical Decision Making)   Heavy mestraul period  Pregnancy test negative  Pt has chronic dizziness due to meniere disease, no change in its pattern or serverity  Hb 12  Dc home, refused any temporary estrogen therapy for control of bleeding, fu with PCP/ GYN clinic  I have reviewed the nursing notes.    I have reviewed the findings, diagnosis, plan and need for follow up with the  patient.      Discharge Medication List as of 5/7/2018  1:29 AM          Final diagnoses:   DUB (dysfunctional uterine bleeding)       5/6/2018   [facility]     Todd Josue MD  05/07/18 [id number]

## 2018-05-07 NOTE — DISCHARGE INSTRUCTIONS
Dysfunctional Uterine Bleeding    Dysfunctional uterine bleeding, also called abnormal uterine bleeding, is a condition in which bleeding is abnormal and occurs at unexpected times of the month. This happens because of changes in the hormones that help control a woman s menstrual cycle each month.  The bleeding may be heavier or lighter than normal. If you have heavy bleeding often, this can lead to a problem called anemia. With anemia, your red blood cell count is too low. Red blood cells help carry oxygen throughout your body. Severe anemia may cause you to look pale and feel very weak or tired. You might also become short of breath easily.  To treat dysfunctional uterine bleeding, medicines are often tried first. If these don t help, or if you have additional symptoms or have reached menopause, further testing and treatments may be needed. Discuss all of your options with your provider.  Home care  Medicines  If you re prescribed medicines, be sure to take them as directed. Some of the more common medicines you may be prescribed include:    Hormone therapy (Options include most methods of hormonal birth control such as pills, shots, or a hormone-releasing IUD)    Nonsteroidal anti-inflammatory drugs (NSAIDs), such as ibuprofen    Iron supplements, if you have anemia     General care    Get plenty of rest if you tire easily. Avoid heavy exertion.    To help relieve pain or cramping that may occur with bleeding, try using a heating pad on the lower belly or back. A warm bath may also help.  Follow-up care  Follow up with your healthcare provider, or as directed.  When to seek medical advice  Call your healthcare provider right away if:    Bleeding becomes heavy (soaking 1 pad or tampon every hour for 3 hours)    Increased abdominal pain    Irregular bleeding worsens or does not get better even with treatment    Fever of 100.4 F (38 C) or higher, or as directed by your provider    Signs of anemia, such as pale  skin, extreme fatigue or weakness, or shortness of breath    Dizziness or fainting   Date Last Reviewed: 11/1/2017 2000-2017 [employer]. [address] [place] [address], [place] [address]. All rights reserved. This information is not intended as a substitute for professional medical care. Always follow your healthcare professional's instructions.

## 2018-05-07 NOTE — ED NOTES
Pt given discharge instructions.  Pt understands plan of care with no further questions.  Pt ambulatory.

## 2018-05-07 NOTE — ED NOTES
Pt presents to ER for c/o cramping and heavy vaginal bleeding since Thurs.  I wanted to come in this weekend, but I didn't have anyone to come with me.  I just decided to come myself.  Call light in reach.

## 2018-05-10 ENCOUNTER — TELEPHONE (OUTPATIENT)
Dept: PSYCHIATRY | Facility: OTHER | Age: 33
End: 2018-05-10

## 2018-05-10 NOTE — TELEPHONE ENCOUNTER
Pt called in asking how long she has to be on Zoloft.  States that this is really messing with her Meniere's disease; always dizzy.  She is on 50 mg; wonders if it is ok to just stop.  She feels a mess.  Will send message to Nathaly; and appeal to her insurance company for a prior auth on the Viibryd.  Claudia Fall

## 2018-05-11 NOTE — TELEPHONE ENCOUNTER
Called pt; notified her that Viibryd 10 mg got prior approved today.  She will pick it up at the pharmacy.  Needs to know if ok to start?  She stopped the Zoloft d/t side effects.  Message to Nathaly for her advice.  Claudia Fall

## 2018-06-05 DIAGNOSIS — F33.0 MAJOR DEPRESSIVE DISORDER, RECURRENT EPISODE, MILD (H): ICD-10-CM

## 2018-06-07 RX ORDER — VILAZODONE HYDROCHLORIDE 10 MG/1
TABLET ORAL
Qty: 30 TABLET | Refills: 0 | OUTPATIENT
Start: 2018-06-07

## 2018-06-07 RX ORDER — VILAZODONE HYDROCHLORIDE 20 MG/1
20 TABLET ORAL DAILY
Qty: 30 TABLET | Refills: 0 | Status: SHIPPED | OUTPATIENT
Start: 2018-06-07 | End: 2018-07-01

## 2018-06-07 NOTE — TELEPHONE ENCOUNTER
Patient was concerned that she may be pregnant, please ask her if she would mind an HCG before refill?

## 2018-06-07 NOTE — TELEPHONE ENCOUNTER
----- Message from NINO Sanchez CNP sent at 6/7/2018  9:55 AM CDT -----  Regarding: Please call  Please call and tell this patient I went up on her med to 20mg and that in a week she can go to 40mg and we can refill the medication at the higher dosage down the road? I put it in at [facility]. Thanks!

## 2018-06-07 NOTE — TELEPHONE ENCOUNTER
Pt called in as she missed a phone call.  Checked on refill status; concern on possible pregnancy.  Per pt; she is not pregnant; was confirmed.  She did have a question on the Viibryd; can the dose be increased?  States that it is working for her; but feels that an increase might be better.  Message to Bassam Fall

## 2018-12-19 PROBLEM — F41.9 ANXIETY DISORDER: Status: ACTIVE | Noted: 2018-03-28

## 2018-12-19 PROBLEM — F32.A DEPRESSION: Status: ACTIVE | Noted: 2018-03-28

## 2019-08-22 DIAGNOSIS — H81.02 MENIERE'S DISEASE, LEFT: ICD-10-CM

## 2019-08-22 DIAGNOSIS — J30.2 SEASONAL ALLERGIC RHINITIS: ICD-10-CM

## 2019-08-23 RX ORDER — CETIRIZINE HYDROCHLORIDE 10 MG/1
TABLET ORAL
Qty: 30 TABLET | Refills: 0 | Status: SHIPPED | OUTPATIENT
Start: 2019-08-23 | End: 2019-10-30

## 2019-09-30 DIAGNOSIS — H81.02 MENIERE'S DISEASE, LEFT: ICD-10-CM

## 2019-09-30 DIAGNOSIS — J30.2 SEASONAL ALLERGIC RHINITIS: ICD-10-CM

## 2019-10-02 RX ORDER — CETIRIZINE HYDROCHLORIDE 10 MG/1
TABLET ORAL
Qty: 30 TABLET | Refills: 0 | OUTPATIENT
Start: 2019-10-02

## 2019-10-30 DIAGNOSIS — J30.2 SEASONAL ALLERGIC RHINITIS: ICD-10-CM

## 2019-10-30 DIAGNOSIS — H81.02 MENIERE'S DISEASE, LEFT: ICD-10-CM

## 2019-10-31 RX ORDER — CETIRIZINE HYDROCHLORIDE 10 MG/1
TABLET ORAL
Qty: 30 TABLET | Refills: 0 | Status: SHIPPED | OUTPATIENT
Start: 2019-10-31 | End: 2020-09-16

## 2020-09-15 DIAGNOSIS — H81.02 MENIERE'S DISEASE, LEFT: ICD-10-CM

## 2020-09-16 RX ORDER — CETIRIZINE HYDROCHLORIDE 10 MG/1
TABLET ORAL
Qty: 30 TABLET | Refills: 0 | Status: SHIPPED | OUTPATIENT
Start: 2020-09-16

## 2020-09-16 NOTE — TELEPHONE ENCOUNTER
Zyrtec       Last Written Prescription Date:  10/31/2019  Last Fill Quantity: 30,   # refills: 0  Last Office Visit: 3/18/2018  Future Office visit:

## 2021-05-18 DIAGNOSIS — H81.02 MENIERE'S DISEASE, LEFT: ICD-10-CM

## 2021-05-19 RX ORDER — CETIRIZINE HYDROCHLORIDE 10 MG/1
TABLET ORAL
Qty: 30 TABLET | Refills: 0 | OUTPATIENT
Start: 2021-05-19